# Patient Record
Sex: FEMALE | Race: WHITE | NOT HISPANIC OR LATINO | Employment: FULL TIME | URBAN - METROPOLITAN AREA
[De-identification: names, ages, dates, MRNs, and addresses within clinical notes are randomized per-mention and may not be internally consistent; named-entity substitution may affect disease eponyms.]

---

## 2017-02-03 ENCOUNTER — TRANSFERRED RECORDS (OUTPATIENT)
Dept: HEALTH INFORMATION MANAGEMENT | Facility: CLINIC | Age: 32
End: 2017-02-03

## 2017-07-01 ENCOUNTER — HEALTH MAINTENANCE LETTER (OUTPATIENT)
Age: 32
End: 2017-07-01

## 2018-04-19 NOTE — PROGRESS NOTES
SUBJECTIVE:   CC: Mary Alice Aragon is an 33 year old woman who presents for preventive health visit.     Patient of Dr. Adelaida Lee from [facility] - transferring care to us at [facility].  Records requested.     Has an eating disorder - working with her therapist on that. Her therapist Used to work at the [employer]. She's never discussed it with a doctor.  Started with food restriction and purging as a teenager.  Went into remission for years.   Son was stillborn - 36.5 EGA.  - his 2nd birthday would have been 2 weeks ago.   Seeing her therapist every 2 weeks. Looks to food currently for comfort and binges /sometimes purges , also having some food restriction issues now. Nothing found on autopsy or genetic testing with fetal  demise.     Getting some anxiety with chest pain. Worries that she has a bit of an ache that gets worse with anxiety. Has xanax on hand for severe anxiety.   Her buspirone helps a little, but increased dose seemed to make things worse.  Citalopram working well - we discussed that it is concerned     Wt Readings from Last 5 Encounters:   04/20/18 121 lb 3.2 oz (55 kg)   04/04/16 153 lb (69.4 kg)   11/11/14 139 lb 14.4 oz (63.5 kg)   04/30/14 126 lb (57.2 kg)   01/23/13 145 lb (65.8 kg)        She'd like to start trying for another pregnancy in fall of this year. She'd like to see another ob/gyn for care as her previous wasn't a good fit for her.      Chronic neck issues for year - massage therapy working well for her.  Tendency for muscle spasms.  No hx of MVA or other definite injury.  No hx of DDD or other OA changes in neck.  Has had negative imaging.  Very occasional  numbness, tingling on the left side - comes and goes, mild  But no weakness in upper or lower extremities. No bowel or bladder control problems.       Healthy Habits:    Do you get at least three servings of calcium containing foods daily (dairy, green leafy vegetables, etc.)? yes    Amount of  exercise or daily activities, outside of work: Walking    Problems taking medications regularly No    Medication side effects: No    Have you had an eye exam in the past two years? no    Do you see a dentist twice per year? yes    Do you have sleep apnea, excessive snoring or daytime drowsiness?no      Anxiety Follow-Up:     Status since last visit: Doing well    Other associated symptoms:None    Complicating factors:   Significant life event: Yes-  Son passed away in 2016, he was still born.  Current substance abuse: Marijuana  Depression symptoms: Yes-  A little  SHANICE-7 SCORE 2013   Total Score 1 18 -   Total Score - - 9   SHANICE-7      Today's PHQ-2 Score:   PHQ-2 (  [employer]) 2018   Q1: Little interest or pleasure in doing things 2 3   Q2: Feeling down, depressed or hopeless 1 3   PHQ-2 Score 3 6       Abuse: Current or Past(Physical, Sexual or Emotional)- No  Do you feel safe in your environment - Yes      Social History   Substance Use Topics     Smoking status: Former Smoker     Packs/day: 0.50     Years: 12.00     Types: Cigarettes     Quit date: 2011     Smokeless tobacco: Never Used      Comment: Quit 2011     Alcohol use Yes      Comment: 2 beers weekly     If you drink alcohol do you typically have >3 drinks per day or >7 drinks per week? No                     Reviewed orders with patient.  Reviewed health maintenance and updated orders accordingly - Yes  BP Readings from Last 3 Encounters:   18 126/74   16 119/70   14 102/58    Wt Readings from Last 3 Encounters:   18 121 lb 3.2 oz (55 kg)   16 153 lb (69.4 kg)   14 139 lb 14.4 oz (63.5 kg)                  Patient Active Problem List   Diagnosis     Recurrent UTI     Anxiety disorder     Moderate Depression [296.32]     Former smoker     Hyperlipidemia LDL goal <160     Missed      Indication for care in labor or delivery     Fetal demise       (spontaneous vaginal delivery)     Previous stillbirth or demise, antepartum - at 36.5 wks EGA  4/5/2016      Neck muscle spasm     Other mixed anxiety disorders     Controlled substance agreement signed- 4/20/2018 - ok for #30 xanax 1mg tabs every 3-4 months or so for severe anxiety - ok to see q6mos     Marijuana use, continuous- nightly for sleep - working with therapist on quitting      Adjustment disorder with mixed anxiety and depressed mood- since stillbirth of son 4/5/2016      Past Surgical History:   Procedure Laterality Date     CT SCAN ABDOMEN/PELVIS  12/2009    normal     DILATION AND CURETTAGE SUCTION N/A 11/11/2014    Procedure: DILATION AND CURETTAGE SUCTION;  Surgeon: Adelaida Lee MD;  Location: [facility] ESOPHAGOSCOPY, DIAGNOSTIC  12/2009    normal, negative bx for sprue and H. pylori     HC US ABDOMEN COMPLETE  12/2009    normal     NO HISTORY OF SURGERY         Social History   Substance Use Topics     Smoking status: Former Smoker     Packs/day: 0.50     Years: 12.00     Types: Cigarettes     Quit date: 7/31/2011     Smokeless tobacco: Never Used      Comment: Quit 07/2011     Alcohol use Yes      Comment: 2 beers weekly     Family History   Problem Relation Age of Onset     Hypertension Mother      Lipids Mother      Thyroid Disease Mother      hypothyroidism     Lipids Father      Psychotic Disorder Paternal Grandmother      depression/anxiety     Hypertension Paternal Grandfather      Osteoarthritis Maternal Grandmother      DIABETES Maternal Grandmother      type 2     CANCER Maternal Grandmother      cancer in the neck, nonsmoker     Hypertension Maternal Grandmother      CANCER Maternal Grandfather      lung, smoker     CANCER Paternal Aunt      brain, early 40's         Current Outpatient Prescriptions   Medication Sig Dispense Refill     ALPRAZolam (XANAX) 1 MG tablet Take 1 tablet (1 mg) by mouth 3 times daily as needed for anxiety 30 tablet 1     busPIRone (BUSPAR) 10  MG tablet Take 2 tablets (20 mg) by mouth daily 90 tablet 0     citalopram (CELEXA) 40 MG tablet Take 1 tablet (40 mg) by mouth daily 90 tablet 3     Krill Oil 1000 MG CAPS Take 1,000 mg by mouth 2 times daily       [DISCONTINUED] citalopram (CELEXA) 40 MG tablet Take 1 tablet (40 mg) by mouth daily 90 tablet 3     Allergies   Allergen Reactions     Ciprofloxacin Rash     Recent Labs   Lab Test  01/16/13   0804 08/17/11 08/05/10   LDL  106  149.0  117@   HDL  62  62  68@   TRIG  132  131  88@   ALT   --   12  12@   CR   --   0.6  0.7@   POTASSIUM   --   4.8  4.8@   TSH  1.32  1.41   --       Mammogram not appropriate for this patient based on age.    Us Breast Bilateral Limited 1-3 Quadrants    Result Date: 8/16/2016  Narrative: MAMMOGRAPHY DIAGNOSTIC BILATERAL W/ TOMOSYNTHESIS, US BREAST BILATERAL LIMITED ONE-THREE QUADRANTS- 8/16/2016 9:28 AM  HISTORY: 31-year-old woman with a palpable lump in the left breast. COMPARISON: None, baseline BREAST DENSITY: Heterogeneously dense. FINDINGS: A bilateral mammogram with tomosynthesis was performed. A marker was placed at the site of the palpable lump in the left breast and there is a partially obscured mass underlying the marker. There is also a partially obscured mass in the lateral right breast. Bilateral breast ultrasound was performed. Ultrasound targeted to the palpable lump in the left breast demonstrates a simple cyst measuring 1.7 cm in diameter, at the 3 o'clock position 3 cm from the nipple. Ultrasound of the lateral right breast was performed because of the mammographic finding. At the 8 o'clock position 1 cm from the nipple, there is a simple cyst measuring 0.9 cm. No solid mass or worrisome sonographic finding is seen in either breast.       History of abnormal Pap smear: NO - age 30- 65 PAP every 3 years recommended    Lab Results   Component Value Date    PAP NIL 01/23/2013    PAP NIL 01/18/2012    PAP NIL 11/09/2010       Reviewed and updated as needed this  visit by clinical staff  Tobacco  Allergies  Meds  Med Hx  Surg Hx  Fam Hx  Soc Hx        Reviewed and updated as needed this visit by Provider        Past Medical History:   Diagnosis Date     Anxiety disorder     with strong component of GI symptoms     Bronchospasm     bronchospasm triggered by exercise, smoking     Former smoker     quit  with a 6-pack-year history     Major depressive disorder, recurrent episode, moderate (H) 2000/10    was treated with Effexor, saw Dr. Polanco, h/o suicide attempt asa/tylenol , tylenol ,self mutilation.  Bipolar diagnosis entertained     Mixed hyperlipidemia      Obesity     resolved - previously had increased bronchospasm when she was 100lbs heavier      Other mixed anxiety disorders 2018     Recurrent UTI     saw urol tx'd with flomax      Past Surgical History:   Procedure Laterality Date     CT SCAN ABDOMEN/PELVIS  2009    normal     DILATION AND CURETTAGE SUCTION N/A 2014    Procedure: DILATION AND CURETTAGE SUCTION;  Surgeon: Adelaida Lee MD;  Location: [facility] ESOPHAGOSCOPY, DIAGNOSTIC  2009    normal, negative bx for sprue and H. pylori     HC US ABDOMEN COMPLETE  2009    normal     NO HISTORY OF SURGERY       Obstetric History       T0      L0     SAB1   TAB0   Ectopic0   Multiple0   Live Births0       # Outcome Date GA Lbr Alvin/2nd Weight Sex Delivery Anes PTL Lv   2  / 36w4d 07:30 / 02:58 5 lb 2.7 oz (2.344 kg) M Vag-Spont EPI N FD      Apgar1:  0                Apgar5: 0   1 SAB                   ROS:   CONSTITUTIONAL: NEGATIVE for fever, chills, change in weight  INTEGUMENTARU/SKIN: NEGATIVE for worrisome rashes, moles or lesions  EYES: NEGATIVE for vision changes or irritation  ENT: NEGATIVE for ear, mouth and throat problems  RESP: NEGATIVE for significant cough or SOB  BREAST: NEGATIVE for masses, tenderness or discharge  CV: NEGATIVE for chest pain, palpitations or  "peripheral edema  GI: NEGATIVE for nausea, abdominal pain, heartburn, or change in bowel habits  : NEGATIVE for unusual urinary or vaginal symptoms. Periods are regular.  MUSCULOSKELETAL: NEGATIVE for significant arthralgias or myalgia  NEURO: NEGATIVE for weakness, dizziness or paresthesias  PSYCHIATRIC: NEGATIVE for changes in mood or affect    OBJECTIVE:   /74 (BP Location: Left arm, Patient Position: Chair, Cuff Size: Adult Regular)  Pulse 79  Temp 98.1  F (36.7  C) (Oral)  Ht 5' 6\" (1.676 m)  Wt 121 lb 3.2 oz (55 kg)  LMP 04/01/2018 (Exact Date)  SpO2 98%  BMI 19.56 kg/m2  EXAM:  GENERAL: healthy, alert and no distress  EYES: Eyes grossly normal to inspection, PERRL and conjunctivae and sclerae normal  HENT: ear canals and TM's normal, nose and mouth without ulcers or lesions  NECK: no adenopathy, no asymmetry, masses, or scars and thyroid normal to palpation  RESP: lungs clear to auscultation - no rales, rhonchi or wheezes  BREAST: normal without masses, tenderness or nipple discharge and no palpable axillary masses or adenopathy  CV: regular rate and rhythm, normal S1 S2, no S3 or S4, no murmur, click or rub, no peripheral edema and peripheral pulses strong  ABDOMEN: soft, nontender, no hepatosplenomegaly, no masses and bowel sounds normal   (female): normal female external genitalia, normal urethral meatus, vaginal mucosa pink, moist, well rugated, and normal cervix/adnexa/uterus without masses or discharge  MS: no gross musculoskeletal defects noted, no edema  SKIN: no suspicious lesions or rashes, multiple pigmented nevi are noted from scalp to feet.   NEURO: Normal strength and tone, mentation intact and speech normal  PSYCH: mentation appears normal, affect normal/bright.     ASSESSMENT/PLAN:       ICD-10-CM    1. Encounter for routine adult medical exam with abnormal findings Z00.01 Comprehensive metabolic panel     CBC with platelets     TSH with free T4 reflex     Lipid panel reflex " "to direct LDL Fasting   2. Moderate Depression [296.32] F33.1 DEPRESSION ACTION PLAN (DAP)     busPIRone (BUSPAR) 10 MG tablet   3. Previous stillbirth or demise, antepartum - at 36.5 wks EGA  4/2016  O09.299 OB/GYN REFERRAL   4. Neck muscle spasm M62.838    5. Other mixed anxiety disorders F41.3 busPIRone (BUSPAR) 10 MG tablet     ALPRAZolam (XANAX) 1 MG tablet   6. Other specified anxiety disorders F41.8 busPIRone (BUSPAR) 10 MG tablet     citalopram (CELEXA) 40 MG tablet     ALPRAZolam (XANAX) 1 MG tablet   7. Controlled substance agreement signed- 4/20/2018 - ok for #30 xanax 1mg tabs every 3-4 months or so for severe anxiety - ok to see q6mos Z79.899 ALPRAZolam (XANAX) 1 MG tablet   8. Marijuana use, continuous- nightly for sleep - working with therapist on quitting  F12.90    9. Adjustment disorder with mixed anxiety and depressed mood- since stillbirth of son 4/5/2016  F43.23 busPIRone (BUSPAR) 10 MG tablet     ALPRAZolam (XANAX) 1 MG tablet   10. Multiple pigmented nevi D22.9 DERMATOLOGY REFERRAL   11. Screening for malignant neoplasm of cervix Z12.4 Pap imaged thin layer screen with HPV - recommended age 30 - 65 years (select HPV order below)     HPV High Risk Types DNA Cervical       COUNSELING:   Reviewed preventive health counseling, as reflected in patient instructions    Recheck for fasting lab only visit in the next month or so.      reports that she quit smoking about 6 years ago. Her smoking use included Cigarettes. She has a 6.00 pack-year smoking history. She has never used smokeless tobacco.    Estimated body mass index is 19.56 kg/(m^2) as calculated from the following:    Height as of this encounter: 5' 6\" (1.676 m).    Weight as of this encounter: 121 lb 3.2 oz (55 kg).        controlled substance agreement discussed in detail today with patient - line item by line item initialed by patient-  and signed today.     Spent  32 minutes on pt care today outside of preventative care. All face to " face time from 2:30pm  to 3:02pm .  Greater than 50% of time spent in coordination of care/counseling today re:  Moderate Depression [296.32]    Previous stillbirth or demise, antepartum - at 36.5 wks EGA  4/2016     Neck muscle spasm    Other mixed anxiety disorders    Other specified anxiety disorders    Controlled substance agreement signed- 4/20/2018 - ok for #30 xanax 1mg tabs every 3-4 months or so for severe anxiety - ok to see q6mos    Marijuana use, continuous- nightly for sleep - working with therapist on quitting     Adjustment disorder with mixed anxiety and depressed mood- since stillbirth of son 4/5/2016     Multiple pigmented nevi       Pt planning to quit smoking marijuana prior to conception - encouraged pt to set a quit date for this to continue controlled substance agreement.      Counseling Resources:  ATP IV Guidelines  Pooled Cohorts Equation Calculator  Breast Cancer Risk Calculator  FRAX Risk Assessment  ICSI Preventive Guidelines  Dietary Guidelines for Americans, 2010  USDA's MyPlate  ASA Prophylaxis  Lung CA Screening    Corin Villagran MD  [facility]

## 2018-04-19 NOTE — PATIENT INSTRUCTIONS
Strongly recommend Yoga for your neck and back .   Yoga for You is a good class option for beginners.     Yoga for beginners on YouTube , [employer] is another option - always look for beginners.     Start a prenatal vitamin with folic acid now - to take daily - in case conception occurs earlier than expected.     Traditional Chinese medicine practitioners:   Wanda Mendez L.Ac ([place]), [facility]   Licensed Accupuncturist  Master of [place] [employer]     [facility]  Phone : [phone]   [facility] [place] [address]    [email or web address]    [facility] in [place]  ask for LYRIC Deng  [facility]   Phone: [phone]   [facility]   [address]   [place] [address]   (Enter through door E or F, clinic is directly between both entrances)  Affiliated with Dr. Christian below    Dr. Mikel Christian, DENISE  [facility]  [address]   [address]  [place] [address]  Phone:  [phone]  Fax: [phone]   [email or web address]    Recheck for fasting lab only visit in the next month or so.     Recheck with me again in 6 months or sooner , if needed.       Preventive Health Recommendations  Female Ages 26 - 39  Yearly exam:   See your health care provider every year in order to    Review health changes.     Discuss preventive care.      Review your medicines if you your doctor has prescribed any.    Until age 30: Get a Pap test every three years (more often if you have had an abnormal result).    After age 30: Talk to your doctor about whether you should have a Pap test every 3 years or have a Pap test with HPV screening every 5 years.   You do not need a Pap test if your uterus was removed (hysterectomy) and you have not had cancer.  You should be tested each year for STDs (sexually transmitted diseases), if you're at risk.   Talk to your provider about how often to  have your cholesterol checked.  If you are at risk for diabetes, you should have a diabetes test (fasting glucose).  Shots: Get a flu shot each year. Get a tetanus shot every 10 years.   Nutrition:     Eat at least 5 servings of fruits and vegetables each day.    Eat whole-grain bread, whole-wheat pasta and brown rice instead of white grains and rice.    Talk to your provider about Calcium and Vitamin D.     Lifestyle    Exercise at least 150 minutes a week (30 minutes a day, 5 days of the week). This will help you control your weight and prevent disease.    Limit alcohol to one drink per day.    No smoking.     Wear sunscreen to prevent skin cancer.    See your dentist every six months for an exam and cleaning.               Thank you for choosing [facility]  for your Health Care. It was a pleasure seeing you at your visit today. Please contact us with any questions or concerns you may have.                   Corin Villagran MD                                  To reach your [facility] care team after hours call:   [phone]    Our clinic hours are:     Monday- 7:30 am - 7:00 pm                             Tuesday through Friday- 7:30 am - 5:00 pm                                        Saturday- 8:00 am - 12:00 pm                  Phone:  [phone]    Our pharmacy hours are:     Monday  8:00 am to 7:00 pm      Tuesday through Friday 8:00am to 6:00pm                        Saturday - 9:00 am to 1:00 pm      Sunday : Closed.              Phone:  [phone]      There is also information available at our web site:  [email or web address]    If your provider ordered any lab tests and you do not receive the results within 10 business days, please call the clinic.    If you need a medication refill please contact your pharmacy.  Please allow 2 business days for your refill to be completed.    Our clinic offers telephone visits and e visits.  Please ask one of your team members  to explain more.      Use [email or web address] (secure email communication and access to your chart) to send your primary care provider a message or make an appointment. Ask someone on your Team how to sign up for [email or web address].        Please do set a quit date for your marijuana use prior to conception - as soon as possible.

## 2018-04-20 ENCOUNTER — OFFICE VISIT (OUTPATIENT)
Dept: FAMILY MEDICINE | Facility: CLINIC | Age: 33
End: 2018-04-20
Payer: COMMERCIAL

## 2018-04-20 VITALS
SYSTOLIC BLOOD PRESSURE: 126 MMHG | OXYGEN SATURATION: 98 % | HEIGHT: 66 IN | BODY MASS INDEX: 19.48 KG/M2 | HEART RATE: 79 BPM | WEIGHT: 121.2 LBS | DIASTOLIC BLOOD PRESSURE: 74 MMHG | TEMPERATURE: 98.1 F

## 2018-04-20 DIAGNOSIS — F41.8 OTHER SPECIFIED ANXIETY DISORDERS: ICD-10-CM

## 2018-04-20 DIAGNOSIS — F33.1 MAJOR DEPRESSIVE DISORDER, RECURRENT EPISODE, MODERATE (H): ICD-10-CM

## 2018-04-20 DIAGNOSIS — F12.90 MARIJUANA USE, CONTINUOUS: ICD-10-CM

## 2018-04-20 DIAGNOSIS — F41.3 OTHER MIXED ANXIETY DISORDERS: ICD-10-CM

## 2018-04-20 DIAGNOSIS — O09.299 PREVIOUS STILLBIRTH OR DEMISE, ANTEPARTUM: ICD-10-CM

## 2018-04-20 DIAGNOSIS — D22.9 MULTIPLE PIGMENTED NEVI: ICD-10-CM

## 2018-04-20 DIAGNOSIS — Z00.01 ENCOUNTER FOR ROUTINE ADULT MEDICAL EXAM WITH ABNORMAL FINDINGS: Primary | ICD-10-CM

## 2018-04-20 DIAGNOSIS — Z12.4 SCREENING FOR MALIGNANT NEOPLASM OF CERVIX: ICD-10-CM

## 2018-04-20 DIAGNOSIS — M62.838 NECK MUSCLE SPASM: ICD-10-CM

## 2018-04-20 DIAGNOSIS — F43.23 ADJUSTMENT DISORDER WITH MIXED ANXIETY AND DEPRESSED MOOD: ICD-10-CM

## 2018-04-20 DIAGNOSIS — Z79.899 CONTROLLED SUBSTANCE AGREEMENT SIGNED: ICD-10-CM

## 2018-04-20 PROCEDURE — 87624 HPV HI-RISK TYP POOLED RSLT: CPT | Performed by: FAMILY MEDICINE

## 2018-04-20 PROCEDURE — G0145 SCR C/V CYTO,THINLAYER,RESCR: HCPCS | Performed by: FAMILY MEDICINE

## 2018-04-20 PROCEDURE — 99385 PREV VISIT NEW AGE 18-39: CPT | Performed by: FAMILY MEDICINE

## 2018-04-20 PROCEDURE — 99214 OFFICE O/P EST MOD 30 MIN: CPT | Mod: 25 | Performed by: FAMILY MEDICINE

## 2018-04-20 RX ORDER — BUSPIRONE HYDROCHLORIDE 10 MG/1
20 TABLET ORAL DAILY
COMMUNITY
End: 2018-04-20

## 2018-04-20 RX ORDER — ALPRAZOLAM 1 MG
1 TABLET ORAL 3 TIMES DAILY PRN
Qty: 30 TABLET | Refills: 1 | Status: SHIPPED | OUTPATIENT
Start: 2018-04-20

## 2018-04-20 RX ORDER — BUSPIRONE HYDROCHLORIDE 10 MG/1
20 TABLET ORAL DAILY
Qty: 90 TABLET | Refills: 0 | Status: SHIPPED | OUTPATIENT
Start: 2018-04-20

## 2018-04-20 RX ORDER — ALPRAZOLAM 1 MG
1 TABLET ORAL 3 TIMES DAILY PRN
COMMUNITY
End: 2018-04-20

## 2018-04-20 RX ORDER — CITALOPRAM HYDROBROMIDE 40 MG/1
40 TABLET ORAL DAILY
Qty: 90 TABLET | Refills: 3 | Status: SHIPPED | OUTPATIENT
Start: 2018-04-20 | End: 2019-05-06

## 2018-04-20 ASSESSMENT — PATIENT HEALTH QUESTIONNAIRE - PHQ9: 5. POOR APPETITE OR OVEREATING: MORE THAN HALF THE DAYS

## 2018-04-20 ASSESSMENT — ANXIETY QUESTIONNAIRES
2. NOT BEING ABLE TO STOP OR CONTROL WORRYING: MORE THAN HALF THE DAYS
IF YOU CHECKED OFF ANY PROBLEMS ON THIS QUESTIONNAIRE, HOW DIFFICULT HAVE THESE PROBLEMS MADE IT FOR YOU TO DO YOUR WORK, TAKE CARE OF THINGS AT HOME, OR GET ALONG WITH OTHER PEOPLE: SOMEWHAT DIFFICULT
7. FEELING AFRAID AS IF SOMETHING AWFUL MIGHT HAPPEN: SEVERAL DAYS
3. WORRYING TOO MUCH ABOUT DIFFERENT THINGS: MORE THAN HALF THE DAYS
5. BEING SO RESTLESS THAT IT IS HARD TO SIT STILL: NOT AT ALL
1. FEELING NERVOUS, ANXIOUS, OR ON EDGE: SEVERAL DAYS
GAD7 TOTAL SCORE: 9
6. BECOMING EASILY ANNOYED OR IRRITABLE: SEVERAL DAYS

## 2018-04-20 NOTE — LETTER
My Depression Action Plan  Name: Mary Alice Aragon   Date of Birth 1985  Date: 4/19/2018    My doctor: Adelaida Lee   My clinic: [address] [id number] [phone]          GREEN    ZONE   Good Control    What it looks like:     Things are going generally well. You have normal up s and down s. You may even feel depressed from time to time, but bad moods usually last less than a day.   What you need to do:  1. Continue to care for yourself (see self care plan)  2. Check your depression survival kit and update it as needed  3. Follow your physician s recommendations including any medication.  4. Do not stop taking medication unless you consult with your physician first.           YELLOW         ZONE Getting Worse    What it looks like:     Depression is starting to interfere with your life.     It may be hard to get out of bed; you may be starting to isolate yourself from others.    Symptoms of depression are starting to last most all day and this has happened for several days.     You may have suicidal thoughts but they are not constant.   What you need to do:     1. Call your care team, your response to treatment will improve if you keep your care team informed of your progress. Yellow periods are signs an adjustment may need to be made.     2. Continue your self-care, even if you have to fake it!    3. Talk to someone in your support network    4. Open up your depression survival kit           RED    ZONE Medical Alert - Get Help    What it looks like:     Depression is seriously interfering with your life.     You may experience these or other symptoms: You can t get out of bed most days, can t work or engage in other necessary activities, you have trouble taking care of basic hygiene, or basic responsibilities, thoughts of suicide or death that will not go away, self-injurious behavior.     What you need to do:  1. Call  your care team and request a same-day appointment. If they are not available (weekends or after hours) call your local crisis line, emergency room or 911.            Depression Self Care Plan / Survival Kit    Self-Care for Depression  Here s the deal. Your body and mind are really not as separate as most people think.  What you do and think affects how you feel and how you feel influences what you do and think. This means if you do things that people who feel good do, it will help you feel better.  Sometimes this is all it takes.  There is also a place for medication and therapy depending on how severe your depression is, so be sure to consult with your medical provider and/ or Behavioral Health Consultant if your symptoms are worsening or not improving.     In order to better manage my stress, I will:    Exercise  Get some form of exercise, every day. This will help reduce pain and release endorphins, the  feel good  chemicals in your brain. This is almost as good as taking antidepressants!  This is not the same as joining a gym and then never going! (they count on that by the way ) It can be as simple as just going for a walk or doing some gardening, anything that will get you moving.      Hygiene   Maintain good hygiene (Get out of bed in the morning, Make your bed, Brush your teeth, Take a shower, and Get dressed like you were going to work, even if you are unemployed).  If your clothes don't fit try to get ones that do.    Diet  I will strive to eat foods that are good for me, drink plenty of water, and avoid excessive sugar, caffeine, alcohol, and other mood-altering substances.  Some foods that are helpful in depression are: complex carbohydrates, B vitamins, flaxseed, fish or fish oil, fresh fruits and vegetables.    Psychotherapy  I agree to participate in Individual Therapy (if recommended).    Medication  If prescribed medications, I agree to take them.  Missing doses can result in serious side effects.   I understand that drinking alcohol, or other illicit drug use, may cause potential side effects.  I will not stop my medication abruptly without first discussing it with my provider.    Staying Connected With Others  I will stay in touch with my friends, family members, and my primary care provider/team.    Use your imagination  Be creative.  We all have a creative side; it doesn t matter if it s oil painting, sand castles, or mud pies! This will also kick up the endorphins.    Witness Beauty  (AKA stop and smell the roses) Take a look outside, even in mid-winter. Notice colors, textures. Watch the squirrels and birds.     Service to others  Be of service to others.  There is always someone else in need.  By helping others we can  get out of ourselves  and remember the really important things.  This also provides opportunities for practicing all the other parts of the program.    Humor  Laugh and be silly!  Adjust your TV habits for less news and crime-drama and more comedy.    Control your stress  Try breathing deep, massage therapy, biofeedback, and meditation. Find time to relax each day.     My support system    Clinic Contact:  Phone number:    Contact 1:  Phone number:    Contact 2:  Phone number:    [employer]/:  Phone number:    Therapist:  Phone number:    [facility]:    Phone number:    Other community support:  Phone number:

## 2018-04-20 NOTE — LETTER
[address] [address]  Phone: [phone]  Fax: [phone]  April 20, 2018     AUTHORIZATION TO RELEASE PROTECTED HEALTH INFORMATION    Patient Name:  Mary Alice Aragon  YOB: 1985    [place] MRN:[id number]             This will authorize [facility]  to request information from :     [facility] Phone: [phone]  Fax: [phone]    The following information is to be released for health maintenance and continuing care purposes with my primary care clinic:                 Pap Smear Report(most recent only)       When: 2014    Visit Notes     Lab Results    -I understand that I may revoke this authorization by written request at any time to the address listed at the top of this form.  I understand that the revocation will not apply to information that has already been released in response to this authorization.    -This authorization last for one year after the date you sign it.     -[facility] cannot prevent redisclosure of the information by the person or organization who receives your records under this authorization, and that information may not be covered by state and federal privacy protections after it is released. By signing this authorization, you release [facility] from any and all liability resulting from a redisclosure by the recipient.    ___________________________________          _____________  Signature of Patient/Authorized Person                     Date        ____________________________________________  (Reason if patient is unable to sign)

## 2018-04-20 NOTE — MR AVS SNAPSHOT
After Visit Summary   4/20/2018    Mary Alice Aragon    MRN: [id number]           Patient Information     Date Of Birth          1985        Visit Information        Provider Department      4/20/2018 2:00 PM Corin Villagran MD [facility]        Today's Diagnoses     Encounter for routine adult medical exam with abnormal findings    -  1    Moderate Depression [296.32]        Screening for malignant neoplasm of cervix        Previous stillbirth or demise, antepartum - at 36.5 wks EGA  4/2016         Neck muscle spasm        Other mixed anxiety disorders        Other specified anxiety disorders        Controlled substance agreement signed- 4/20/2018 - ok for #30 xanax 1mg tabs every 3-4 months or so for severe anxiety - ok to see q6mos        Marijuana use, continuous- nightly for sleep - working with therapist on quitting         Adjustment disorder with mixed anxiety and depressed mood- since stillbirth of son 4/5/2016         Multiple pigmented nevi          Care Instructions    Strongly recommend Yoga for your neck and back .   Yoga for You is a good class option for beginners.     Yoga for beginners on YouTube , [employer] is another option - always look for beginners.     Start a prenatal vitamin with folic acid now - to take daily - in case conception occurs earlier than expected.     Traditional Chinese medicine practitioners:   Wanda Mendez L.Ac (Joyce), MS   Licensed Accupuncturist  Master of [facility]     [facility]  Phone : [phone]   [facility] [place] [address]    [email or web address]    [facility] in [place]  ask for LYRIC Deng  [facility]   Phone: [phone]   [address]   [place] [address]   (Enter through door E or F, clinic is directly between both entrances)  Affiliated with Dr. Christian  below    Dr. Mikel Christian, TCMD  [facility]  [address]   [address]  [place] [address]  Phone:  [phone]  Fax: [phone]   [email or web address]    Recheck for fasting lab only visit in the next month or so.     Recheck with me again in 6 months or sooner , if needed.       Preventive Health Recommendations  Female Ages 26 - 39  Yearly exam:   See your health care provider every year in order to    Review health changes.     Discuss preventive care.      Review your medicines if you your doctor has prescribed any.    Until age 30: Get a Pap test every three years (more often if you have had an abnormal result).    After age 30: Talk to your doctor about whether you should have a Pap test every 3 years or have a Pap test with HPV screening every 5 years.   You do not need a Pap test if your uterus was removed (hysterectomy) and you have not had cancer.  You should be tested each year for STDs (sexually transmitted diseases), if you're at risk.   Talk to your provider about how often to have your cholesterol checked.  If you are at risk for diabetes, you should have a diabetes test (fasting glucose).  Shots: Get a flu shot each year. Get a tetanus shot every 10 years.   Nutrition:     Eat at least 5 servings of fruits and vegetables each day.    Eat whole-grain bread, whole-wheat pasta and brown rice instead of white grains and rice.    Talk to your provider about Calcium and Vitamin D.     Lifestyle    Exercise at least 150 minutes a week (30 minutes a day, 5 days of the week). This will help you control your weight and prevent disease.    Limit alcohol to one drink per day.    No smoking.     Wear sunscreen to prevent skin cancer.    See your dentist every six months for an exam and cleaning.               Thank you for choosing [facility]  for your Health Care. It was a pleasure seeing you at your visit today. Please contact us with any questions or  concerns you may have.                   Corin Villagran MD                                  To reach your [facility] care team after hours call:   [phone]    Our clinic hours are:     Monday- 7:30 am - 7:00 pm                             Tuesday through Friday- 7:30 am - 5:00 pm                                        Saturday- 8:00 am - 12:00 pm                  Phone:  [phone]    Our pharmacy hours are:     Monday  8:00 am to 7:00 pm      Tuesday through Friday 8:00am to 6:00pm                        Saturday - 9:00 am to 1:00 pm      Sunday : Closed.              Phone:  [phone]      There is also information available at our web site:  [email or web address]    If your provider ordered any lab tests and you do not receive the results within 10 business days, please call the clinic.    If you need a medication refill please contact your pharmacy.  Please allow 2 business days for your refill to be completed.    Our clinic offers telephone visits and e visits.  Please ask one of your team members to explain more.      Use [employer] (secure email communication and access to your chart) to send your primary care provider a message or make an appointment. Ask someone on your Team how to sign up for [employer].        Please do set a quit date for your marijuana use prior to conception - as soon as possible.                        Follow-ups after your visit        Additional Services     DERMATOLOGY REFERRAL       Your provider has referred you to: [facility]: [facility] [place] [phone]   [email or web address] and full body skin exam     Please be aware that coverage of these services is subject to the terms and limitations of your health insurance plan.  Call member services at your health plan with any benefit or coverage questions.      Please bring the following with you to your appointment:    (1) Any X-Rays, CTs or MRIs which  have been performed.  Contact the facility where they were done to arrange for  prior to your scheduled appointment.  Any new CT, MRI or other procedures ordered by your specialist must be performed at a [facility] facility or coordinated by your clinic's referral office.  (2) List of current medications  (3) This referral request   (4) Any documents/labs given to you for this referral            OB/GYN REFERRAL       Your provider has referred you to:  [facility]: OBGYN BRAYAN Nguyen [phone]   [email or web address] --- Dr. Brittany Humphrey or Dr. Guerline Tafoya or Dr. Margret Tafoya     Please be aware that coverage of these services is subject to the terms and limitations of your health insurance plan.  Call member services at your health plan with any benefit or coverage questions.      Please bring the following with you to your appointment:    (1) Any X-Rays, CTs or MRIs which have been performed.  Contact the facility where they were done to arrange for  prior to your scheduled appointment.  Any new CT, MRI or other procedures ordered by your specialist must be performed at a [facility] facility or coordinated by your clinic's referral office.    (2) List of current medications   (3) This referral request   (4) Any documents/labs given to you for this referral                  Follow-up notes from your care team     Return in about 1 month (around 5/20/2018) for Lab Work as a lab only visit and with Dr. MARTÍNEZ in 6 months for mood .      Future tests that were ordered for you today     Open Future Orders        Priority Expected Expires Ordered    Comprehensive metabolic panel Routine 5/5/2018 8/20/2018 4/20/2018    CBC with platelets Routine 5/5/2018 8/20/2018 4/20/2018    TSH with free T4 reflex Routine 5/5/2018 8/20/2018 4/20/2018    Lipid panel reflex to direct LDL Fasting Routine 5/5/2018 8/20/2018 4/20/2018            Who to contact     If you have questions or need follow up information about  "today's clinic visit or your schedule please contact [facility] directly at [phone].  Normal or non-critical lab and imaging results will be communicated to you by MyChart, letter or phone within 4 business days after the clinic has received the results. If you do not hear from us within 7 days, please contact the clinic through [employer] or phone. If you have a critical or abnormal lab result, we will notify you by phone as soon as possible.  Submit refill requests through [employer] or call your pharmacy and they will forward the refill request to us. Please allow 3 business days for your refill to be completed.          Additional Information About Your Visit        [employer] Information     [employer] gives you secure access to your electronic health record. If you see a primary care provider, you can also send messages to your care team and make appointments. If you have questions, please call your primary care clinic.  If you do not have a primary care provider, please call [phone] and they will assist you.        Care EveryWhere ID     This is your Care EveryWhere ID. This could be used by other organizations to access your [facility] medical records  [id number]        Your Vitals Were     Pulse Temperature Height Last Period Pulse Oximetry BMI (Body Mass Index)    79 98.1  F (36.7  C) (Oral) 5' 6\" (1.676 m) 04/01/2018 (Exact Date) 98% 19.56 kg/m2       Blood Pressure from Last 3 Encounters:   04/20/18 126/74   04/05/16 119/70   11/11/14 102/58    Weight from Last 3 Encounters:   04/20/18 121 lb 3.2 oz (55 kg)   04/04/16 153 lb (69.4 kg)   11/11/14 139 lb 14.4 oz (63.5 kg)              We Performed the Following     DEPRESSION ACTION PLAN (DAP)     DERMATOLOGY REFERRAL     HPV High Risk Types DNA Cervical     OB/GYN REFERRAL     Pap imaged thin layer screen with HPV - recommended age 30 - 65 years (select HPV order below)          Today's Medication Changes          These changes are accurate " as of 4/20/18  3:18 PM.  If you have any questions, ask your nurse or doctor.               Stop taking these medicines if you haven't already. Please contact your care team if you have questions.     prenatal multivitamin plus iron 27-0.8 MG Tabs per tablet   Stopped by:  Corin Villagran MD           VITAMIN D (CHOLECALCIFEROL) PO   Stopped by:  Corin Villagran MD                Where to get your medicines      These medications were sent to [facility] [id number] - [place], [place] - [address] [facility] [address] [address]  [address] [facility] [address] [facility] [address], [place] [address]     Phone:  [phone]     busPIRone 10 MG tablet    citalopram 40 MG tablet         Some of these will need a paper prescription and others can be bought over the counter.  Ask your nurse if you have questions.     Bring a paper prescription for each of these medications     ALPRAZolam 1 MG tablet                Primary Care Provider Office Phone # Fax #    [facility] [phone]       [address] [place] [address]        Equal Access to Services     [facility]: Hadii iman fonseca hadasho Soomaali, waaxda luqadaha, qaybta kaalmada adeegyada, bennett adkins . So [facility] [phone].    ATENCIÓN: Si habla español, tiene a espinoza disposición servicios gratuitos de asistencia lingüística. Llame al [phone].    We comply with applicable federal civil rights laws and [place] laws. We do not discriminate on the basis of race, color, national origin, age, disability, sex, sexual orientation, or gender identity.            Thank you!     Thank you for choosing [facility]  for your care. Our goal is always to provide you with excellent care. Hearing back from our patients is one way we can continue to improve our services. Please take a few minutes to complete the written survey that you may receive in the mail after your visit with us. Thank you!             Your Updated Medication List -  Protect others around you: Learn how to safely use, store and throw away your medicines at www.disposemymeds.org.          This list is accurate as of 4/20/18  3:18 PM.  Always use your most recent med list.                   Brand Name Dispense Instructions for use Diagnosis    ALPRAZolam 1 MG tablet    XANAX    30 tablet    Take 1 tablet (1 mg) by mouth 3 times daily as needed for anxiety    Other mixed anxiety disorders, Other specified anxiety disorders, Controlled substance agreement signed, Adjustment disorder with mixed anxiety and depressed mood       busPIRone 10 MG tablet    BUSPAR    90 tablet    Take 2 tablets (20 mg) by mouth daily    Major depressive disorder, recurrent episode, moderate (H), Other mixed anxiety disorders, Other specified anxiety disorders, Adjustment disorder with mixed anxiety and depressed mood       citalopram 40 MG tablet    celeXA    90 tablet    Take 1 tablet (40 mg) by mouth daily    Other specified anxiety disorders       Krill Oil 1000 MG Caps      Take 1,000 mg by mouth 2 times daily

## 2018-04-20 NOTE — LETTER
[facility] [place]    04/20/18    Patient: Mary Alice Aragon  YOB: 1985  Medical Record Number: [id number]                                                                  Controlled Substance Agreement  I understand that my care provider has prescribed controlled substances (narcotics, tranquilizers, and/or stimulants) to help manage my condition(s).  I am taking this medicine to help me function or work.  I know that this is strong medicine.  It could have serious side effects and even cause a dependency on the drug.  If I stop these medicines suddenly, I could have severe withdrawal symptoms.    The risks, benefits, and side effects of these medication(s) were explained to me.  I agree that:  1. I will take part in other treatments as advised by my provider.  This may be psychiatry or counseling, physical therapy, behavioral therapy, group treatment, or a referral to a pain clinic.  I will reduce or stop my medicine when my provider tells me to do so.   2. I will take my medicines as prescribed.  I will not change the dose or schedule unless my provider tells me to.  There will be no refills if I  run out early.   I may be contacted at any time without warning and asked to complete a drug test or pill count.   3. I will keep all my appointments at the clinic.  If I miss appointments or fail to follow instructions, my provider may stop my medicine.  4. I will not ask other providers to prescribe controlled substances. And I will not accept controlled substances from other people. If I need another prescribed controlled substance for a new reason, I will notify my provider within one business day.  5. If I enroll in the [place] Medical Marijuana program, I will tell my provider.  I will also sign an agreement to share my medical records with my provider.  6. I will use one pharmacy to fill all of my controlled substance prescriptions.  If my prescription is mailed to my pharmacy, it may  take 5 to 7 days for my medicine to be ready.  7. I understand that my provider, clinic care team, and pharmacy can track controlled substance prescriptions from other providers through a central database (prescription monitoring program).  8. I will bring in my list of medications (or my medicine bottles) each time I come to the clinic.  REV- 04/2016                                                                                                                                            Page 1 of 2      [facility] [place]    04/20/18    Patient: Mary Alice Aragon  YOB: 1985  Medical Record Number: [id number]    9. Refills of controlled substances will be made only during office hours.  It is up to me to make sure that I do not run out of my medicines on weekends or holidays.    10. I am responsible for my prescriptions.  If the medicine is lost or stolen, it will not be replaced.   I also agree not to share these medicines with anyone.  11. I agree to not use ANY illegal or recreational drugs.  This includes marijuana, cocaine, bath salts or other drugs.  I agree not to use alcohol unless my provider says I may.  I agree to give urine samples whenever asked.  If I fail to give a urine sample, the provider may stop my medicine.     12. I will tell my nurse or provider right away if I become pregnant or have a new medical problem treated outside of [facility].  13. I understand that this medicine can affect my thinking and judgment.  It may be unsafe for me to drive, use machinery and do dangerous tasks.  I will not do any of these things until I know how the medicine affects me.  If my dose changes, I will wait to see how it affects me.  I will contact my provider if I have concerns about medicine side effects.  I understand that if I do not follow any of the conditions above, my prescriptions or treatment may be stopped.    I agree that my provider, clinic care team, and pharmacy may  work with any city, state or federal law enforcement agency that investigates the misuse, sale, or other diversion of my controlled medicine. I will allow my provider to discuss my care with or share a copy of this agreement with any other treating provider, pharmacy or emergency room where I receive care.  I agree to give up (waive) any right of privacy or confidentiality with respect to these authorizations.   I have read this agreement and have asked questions about anything I did not understand.   ___________________________________    ___________________________  Patient Signature                                                           Date and Time  ___________________________________     ____________________________  Witness                                                                            Date and Time  ___________________________________  Corin Villagran MD  REV-  04/2016                                                                                                                                                                 Page 2 of 2

## 2018-04-20 NOTE — NURSING NOTE
"Chief Complaint   Patient presents with     Physical       Initial /74 (BP Location: Left arm, Patient Position: Chair, Cuff Size: Adult Regular)  Pulse 79  Temp 98.1  F (36.7  C) (Oral)  Ht 5' 6\" (1.676 m)  Wt 121 lb 3.2 oz (55 kg)  LMP 04/01/2018 (Exact Date)  SpO2 98%  BMI 19.56 kg/m2 Estimated body mass index is 19.56 kg/(m^2) as calculated from the following:    Height as of this encounter: 5' 6\" (1.676 m).    Weight as of this encounter: 121 lb 3.2 oz (55 kg).  Medication Reconciliation: complete   Joann Castrejon CMA  "

## 2018-04-21 ASSESSMENT — ANXIETY QUESTIONNAIRES: GAD7 TOTAL SCORE: 9

## 2018-04-21 ASSESSMENT — PATIENT HEALTH QUESTIONNAIRE - PHQ9: SUM OF ALL RESPONSES TO PHQ QUESTIONS 1-9: 8

## 2018-04-23 ENCOUNTER — TRANSFERRED RECORDS (OUTPATIENT)
Dept: HEALTH INFORMATION MANAGEMENT | Facility: CLINIC | Age: 33
End: 2018-04-23

## 2018-04-24 LAB
COPATH REPORT: NORMAL
PAP: NORMAL

## 2018-04-26 LAB
FINAL DIAGNOSIS: NORMAL
HPV HR 12 DNA CVX QL NAA+PROBE: NEGATIVE
HPV16 DNA SPEC QL NAA+PROBE: NEGATIVE
HPV18 DNA SPEC QL NAA+PROBE: NEGATIVE
SPECIMEN DESCRIPTION: NORMAL
SPECIMEN SOURCE CVX/VAG CYTO: NORMAL

## 2018-05-31 DIAGNOSIS — R17 ELEVATED BILIRUBIN: Primary | ICD-10-CM

## 2018-05-31 DIAGNOSIS — Z00.01 ENCOUNTER FOR ROUTINE ADULT MEDICAL EXAM WITH ABNORMAL FINDINGS: ICD-10-CM

## 2018-05-31 LAB
ALBUMIN SERPL-MCNC: 4.3 G/DL (ref 3.4–5)
ALP SERPL-CCNC: 39 U/L (ref 40–150)
ALT SERPL W P-5'-P-CCNC: 25 U/L (ref 0–50)
ANION GAP SERPL CALCULATED.3IONS-SCNC: 8 MMOL/L (ref 3–14)
AST SERPL W P-5'-P-CCNC: 13 U/L (ref 0–45)
BILIRUB SERPL-MCNC: 1.6 MG/DL (ref 0.2–1.3)
BUN SERPL-MCNC: 12 MG/DL (ref 7–30)
CALCIUM SERPL-MCNC: 8.9 MG/DL (ref 8.5–10.1)
CHLORIDE SERPL-SCNC: 104 MMOL/L (ref 94–109)
CHOLEST SERPL-MCNC: 156 MG/DL
CO2 SERPL-SCNC: 27 MMOL/L (ref 20–32)
CREAT SERPL-MCNC: 0.74 MG/DL (ref 0.52–1.04)
ERYTHROCYTE [DISTWIDTH] IN BLOOD BY AUTOMATED COUNT: 11.6 % (ref 10–15)
GFR SERPL CREATININE-BSD FRML MDRD: 89 ML/MIN/1.7M2
GLUCOSE SERPL-MCNC: 81 MG/DL (ref 70–99)
HCT VFR BLD AUTO: 39.8 % (ref 35–47)
HDLC SERPL-MCNC: 64 MG/DL
HGB BLD-MCNC: 13.5 G/DL (ref 11.7–15.7)
LDLC SERPL CALC-MCNC: 80 MG/DL
MCH RBC QN AUTO: 31.5 PG (ref 26.5–33)
MCHC RBC AUTO-ENTMCNC: 33.9 G/DL (ref 31.5–36.5)
MCV RBC AUTO: 93 FL (ref 78–100)
NONHDLC SERPL-MCNC: 92 MG/DL
PLATELET # BLD AUTO: 178 10E9/L (ref 150–450)
POTASSIUM SERPL-SCNC: 4.1 MMOL/L (ref 3.4–5.3)
PROT SERPL-MCNC: 7.6 G/DL (ref 6.8–8.8)
RBC # BLD AUTO: 4.28 10E12/L (ref 3.8–5.2)
SODIUM SERPL-SCNC: 139 MMOL/L (ref 133–144)
TRIGL SERPL-MCNC: 59 MG/DL
TSH SERPL DL<=0.005 MIU/L-ACNC: 0.85 MU/L (ref 0.4–4)
WBC # BLD AUTO: 5.2 10E9/L (ref 4–11)

## 2018-05-31 PROCEDURE — 80061 LIPID PANEL: CPT | Performed by: FAMILY MEDICINE

## 2018-05-31 PROCEDURE — 36415 COLL VENOUS BLD VENIPUNCTURE: CPT | Performed by: FAMILY MEDICINE

## 2018-05-31 PROCEDURE — 85027 COMPLETE CBC AUTOMATED: CPT | Performed by: FAMILY MEDICINE

## 2018-05-31 PROCEDURE — 80053 COMPREHEN METABOLIC PANEL: CPT | Performed by: FAMILY MEDICINE

## 2018-05-31 PROCEDURE — 84443 ASSAY THYROID STIM HORMONE: CPT | Performed by: FAMILY MEDICINE

## 2018-06-13 ENCOUNTER — TELEPHONE (OUTPATIENT)
Dept: FAMILY MEDICINE | Facility: CLINIC | Age: 33
End: 2018-06-13

## 2018-06-13 ENCOUNTER — MYC MEDICAL ADVICE (OUTPATIENT)
Dept: FAMILY MEDICINE | Facility: CLINIC | Age: 33
End: 2018-06-13

## 2018-06-13 DIAGNOSIS — Z00.01 ENCOUNTER FOR ROUTINE ADULT HEALTH EXAMINATION WITH ABNORMAL FINDINGS: Primary | ICD-10-CM

## 2018-06-13 NOTE — TELEPHONE ENCOUNTER
I left detailed message for césar that I entered the hepatic lab  Kim Wagoner RN- Triage [employer]

## 2018-06-13 NOTE — TELEPHONE ENCOUNTER
Reason for Call: Request for an order or referral:    Order or referral being requested: The patient says she received a PHARMAJET message that Dr. Villagran wanted her to have followup labs in a month. This is scheduled for 06/29/2018 at 8:30 a.m. There are no orders in.    Date needed: before my next appointment    Has the patient been seen by the PCP for this problem? YES    Phone number Patient can be reached at:  Cell number on file:    Telephone Information:   Mobile [phone]     Best Time:  Anytime    Can we leave a detailed message on this number?  YES    Call taken on 6/13/2018 at 1:08 PM by Emy Sykes

## 2018-06-15 ENCOUNTER — OFFICE VISIT (OUTPATIENT)
Dept: FAMILY MEDICINE | Facility: CLINIC | Age: 33
End: 2018-06-15
Payer: COMMERCIAL

## 2018-06-15 VITALS — SYSTOLIC BLOOD PRESSURE: 118 MMHG | HEART RATE: 82 BPM | DIASTOLIC BLOOD PRESSURE: 76 MMHG | OXYGEN SATURATION: 97 %

## 2018-06-15 DIAGNOSIS — Z91.89 HISTORY OF SUN EXPOSURE, MODERATE: Primary | ICD-10-CM

## 2018-06-15 DIAGNOSIS — D22.9 MULTIPLE BENIGN NEVI: ICD-10-CM

## 2018-06-15 DIAGNOSIS — B35.6 TINEA CRURIS: ICD-10-CM

## 2018-06-15 DIAGNOSIS — Z80.8 FAMILY HISTORY OF BASAL CELL CARCINOMA: ICD-10-CM

## 2018-06-15 DIAGNOSIS — L81.4 LENTIGINES: ICD-10-CM

## 2018-06-15 PROCEDURE — 99214 OFFICE O/P EST MOD 30 MIN: CPT | Performed by: PHYSICIAN ASSISTANT

## 2018-06-15 NOTE — PATIENT INSTRUCTIONS
Apply cream to affected areas 2x a day for 4-6 weeks.      Proper skin care from [facility]:    -Eliminate harsh soaps as they strip the natural oils from the skin, often resulting in dry itchy skin ( i.e. Dial, Zest, Dagmar Spring)  -Use mild soaps such as Cetaphil or Dove Sensitive Skin in the shower. You do not need to use soap on arms, legs, and trunk every time you shower unless visibly soiled.   -Avoid hot or cold showers.  -After showering, lightly dry off and apply moisturizing within 2-3 minutes. This will help trap moisture in the skin.   -Aggressive use of a moisturizer at least 1-2 times a day to the entire body (including -Vanicream, Cetaphil, Aquaphor or Cerave) and moisturize hands after every washing.  -We recommend using moisturizers that come in a tub that needs to be scooped out, not a pump. This has more of an oil base. It will hold moisture in your skin much better than a water base moisturizer. The above recommended are non-pore clogging.           Wear a sunscreen with at least SPF 30 on your face, ears, neck and V of the chest daily. Wear sunscreen on other areas of the body if those areas are exposed to the sun throughout the day. Sunscreens can contain physical and/or chemical blockers. Physical blockers are less likely to clog pores, these include zinc oxide and titanium dioxide. Reapply every two hour and after swimming. Sunscreen examples include Neutrogena, CeraVe, Blue Lizard, Elta MD and many others.    UV radiation  UVA radiation remains constant throughout the day and throughout the year. It is a longer wavelength than UVB and therefore penetrates deeper into the skin leading to immediate and delayed tanning, photoaging, and skin cancer. 70-80% of UVA and UVB radiation occurs between the hours of 10am-2pm.  UVB radiation  UVB radiation causes the most harmful effects and is more significant during the summer months. However, snow and ice can reflect UVB radiation leading to  skin damage during the winter months as well. UVB radiation is responsible for tanning, burning, inflammation, delayed erythema (pinkness), pigmentation (brown spots), and skin cancer.   Just because you do not burn or are not developing a tan does not mean that you are not damaging your skin. A 15 minute drive to and from work for 30 years an lead to chronic sun damage of the skin. It is important to wear a broad spectrum (both UVA and UVB) sunscreen EVERY day with at least 30 SPF. Apply to face, ears, neck and v of the chest as this is where most of our sun exposure is. Reapply sunscreen every two hours if you plan on being outside.   Leonardo Vazquez. Clinical Dermatology: A Color Guide to Diagnosis and Therapy. Elsevier, 2016.

## 2018-06-15 NOTE — MR AVS SNAPSHOT
After Visit Summary   6/15/2018    Mary Alice Aragon    MRN: [id number]           Patient Information     Date Of Birth          1985        Visit Information        Provider Department      6/15/2018 1:00 PM Mirella Gusman PA-C [facility]        Care Instructions    Apply cream to affected areas 2x a day for 4-6 weeks.      Proper skin care from [facility]:    -Eliminate harsh soaps as they strip the natural oils from the skin, often resulting in dry itchy skin ( i.e. Dial, Zest, [place] Spring)  -Use mild soaps such as Cetaphil or Dove Sensitive Skin in the shower. You do not need to use soap on arms, legs, and trunk every time you shower unless visibly soiled.   -Avoid hot or cold showers.  -After showering, lightly dry off and apply moisturizing within 2-3 minutes. This will help trap moisture in the skin.   -Aggressive use of a moisturizer at least 1-2 times a day to the entire body (including -Vanicream, Cetaphil, Aquaphor or Cerave) and moisturize hands after every washing.  -We recommend using moisturizers that come in a tub that needs to be scooped out, not a pump. This has more of an oil base. It will hold moisture in your skin much better than a water base moisturizer. The above recommended are non-pore clogging.           Wear a sunscreen with at least SPF 30 on your face, ears, neck and V of the chest daily. Wear sunscreen on other areas of the body if those areas are exposed to the sun throughout the day. Sunscreens can contain physical and/or chemical blockers. Physical blockers are less likely to clog pores, these include zinc oxide and titanium dioxide. Reapply every two hour and after swimming. Sunscreen examples include Neutrogena, CeraVe, Blue Lizard, Elta MD and many others.    UV radiation  UVA radiation remains constant throughout the day and throughout the year. It is a longer wavelength than UVB and therefore penetrates deeper into the  skin leading to immediate and delayed tanning, photoaging, and skin cancer. 70-80% of UVA and UVB radiation occurs between the hours of 10am-2pm.  UVB radiation  UVB radiation causes the most harmful effects and is more significant during the summer months. However, snow and ice can reflect UVB radiation leading to skin damage during the winter months as well. UVB radiation is responsible for tanning, burning, inflammation, delayed erythema (pinkness), pigmentation (brown spots), and skin cancer.   Just because you do not burn or are not developing a tan does not mean that you are not damaging your skin. A 15 minute drive to and from work for 30 years an lead to chronic sun damage of the skin. It is important to wear a broad spectrum (both UVA and UVB) sunscreen EVERY day with at least 30 SPF. Apply to face, ears, neck and v of the chest as this is where most of our sun exposure is. Reapply sunscreen every two hours if you plan on being outside.   Leonardo Vazquez. Clinical Dermatology: A Color Guide to Diagnosis and Therapy. Elsevier, 2016.               Follow-ups after your visit        Your next 10 appointments already scheduled     Jun 29, 2018  8:30 AM CDT   LAB with RV LAB   [facility]    [address] [place] [id number] [phone]           Please do not eat 10-12 hours before your appointment if you are coming in fasting for labs on lipids, cholesterol, or glucose (sugar). This does not apply to pregnant women. Water, hot tea and black coffee (with nothing added) are okay. Do not drink other fluids, diet soda or chew gum.              Who to contact     If you have questions or need follow up information about today's clinic visit or your schedule please contact [facility] directly at [phone].  Normal or non-critical lab and imaging results will be communicated to you by MyChart, letter or phone within 4 business  days after the clinic has received the results. If you do not hear from us within 7 days, please contact the clinic through [employer] or phone. If you have a critical or abnormal lab result, we will notify you by phone as soon as possible.  Submit refill requests through [employer] or call your pharmacy and they will forward the refill request to us. Please allow 3 business days for your refill to be completed.          Additional Information About Your Visit        [employer] Information     [employer] gives you secure access to your electronic health record. If you see a primary care provider, you can also send messages to your care team and make appointments. If you have questions, please call your primary care clinic.  If you do not have a primary care provider, please call [phone] and they will assist you.        Care EveryWhere ID     This is your Care EveryWhere ID. This could be used by other organizations to access your [facility] medical records  [id number]        Your Vitals Were     Pulse Last Period Pulse Oximetry             82 05/26/2018 97%          Blood Pressure from Last 3 Encounters:   06/15/18 118/76   04/20/18 126/74   04/05/16 119/70    Weight from Last 3 Encounters:   04/20/18 121 lb 3.2 oz (55 kg)   04/04/16 153 lb (69.4 kg)   11/11/14 139 lb 14.4 oz (63.5 kg)              Today, you had the following     No orders found for display       Primary Care Provider Office Phone # Fax #    Corin Villagran [phone]       [address] [place] [id number]        Equal Access to Services     [facility] AH: Hadii aad ku hadasho Soomaali, waaxda luqadaha, qaybta kaalmada adeegyada, bennett adkins . So [facility] [phone].    ATENCIÓN: Si habla español, tiene a espinoza disposición servicios gratuitos de asistencia lingüística. Llame al [phone].    We comply with applicable federal civil rights laws and [place] laws. We do not discriminate on the  basis of race, color, national origin, age, disability, sex, sexual orientation, or gender identity.            Thank you!     Thank you for choosing [facility]  for your care. Our goal is always to provide you with excellent care. Hearing back from our patients is one way we can continue to improve our services. Please take a few minutes to complete the written survey that you may receive in the mail after your visit with us. Thank you!             Your Updated Medication List - Protect others around you: Learn how to safely use, store and throw away your medicines at www.disposemymeds.org.          This list is accurate as of 6/15/18  1:02 PM.  Always use your most recent med list.                   Brand Name Dispense Instructions for use Diagnosis    ALPRAZolam 1 MG tablet    XANAX    30 tablet    Take 1 tablet (1 mg) by mouth 3 times daily as needed for anxiety    Other mixed anxiety disorders, Other specified anxiety disorders, Controlled substance agreement signed, Adjustment disorder with mixed anxiety and depressed mood       busPIRone 10 MG tablet    BUSPAR    90 tablet    Take 2 tablets (20 mg) by mouth daily    Major depressive disorder, recurrent episode, moderate (H), Other mixed anxiety disorders, Other specified anxiety disorders, Adjustment disorder with mixed anxiety and depressed mood       citalopram 40 MG tablet    celeXA    90 tablet    Take 1 tablet (40 mg) by mouth daily    Other specified anxiety disorders       Krill Oil 1000 MG Caps      Take 1,000 mg by mouth 2 times daily

## 2018-06-15 NOTE — PROGRESS NOTES
HPI:  Mary Alice Aragon is a 33 year old year old female patient here today for full body exam. She has had a rash on left groin area x a few months on and off.   Symptoms:  Denies itching and pain. Doesn't seem to spread     Previous treatments: none     Alleviating/aggravating factors: none    Associated symptoms: none  Additional findings:had a few moles removed in the past. All benign.   Patient has no other skin complaints today.  Remainder of the HPI, Meds, PMH, Allergies, FH, and SH was reviewed in chart.    Pertinent Hx:   Father had BCC  Past Medical History:   Diagnosis Date     Anxiety disorder 2009    with strong component of GI symptoms     Bronchospasm     bronchospasm triggered by exercise, smoking     Former smoker     quit 7-2011 with a 6-pack-year history     Major depressive disorder, recurrent episode, moderate (H) 2000/10    was treated with Effexor, saw Dr. Polanco, h/o suicide attempt asa/tylenol 5/00, tylenol 11/00,self mutilation.  Bipolar diagnosis entertained     Mixed hyperlipidemia      Obesity     resolved 2012- previously had increased bronchospasm when she was 100lbs heavier      Other mixed anxiety disorders 04/20/2018     Recurrent UTI     saw urol tx'd with flomax       Past Surgical History:   Procedure Laterality Date     CT SCAN ABDOMEN/PELVIS  12/2009    normal     DILATION AND CURETTAGE SUCTION N/A 11/11/2014    Procedure: DILATION AND CURETTAGE SUCTION;  Surgeon: Adelaida Lee MD;  Location: [facility] ESOPHAGOSCOPY, DIAGNOSTIC  12/2009    normal, negative bx for sprue and H. pylori     HC US ABDOMEN COMPLETE  12/2009    normal     NO HISTORY OF SURGERY          Family History   Problem Relation Age of Onset     Hypertension Mother      Lipids Mother      Thyroid Disease Mother      hypothyroidism     Lipids Father      Psychotic Disorder Paternal Grandmother      depression/anxiety     Hypertension Paternal Grandfather      Osteoarthritis Maternal  Grandmother      DIABETES Maternal Grandmother      type 2     CANCER Maternal Grandmother      cancer in the neck, nonsmoker     Hypertension Maternal Grandmother      CANCER Maternal Grandfather      lung, smoker     CANCER Paternal Aunt      brain, early 40's       Social History     Social History     Marital status:      Spouse name: Roberto     Number of children: 0     Years of education: 14     Occupational History      [employer]     Social History Main Topics     Smoking status: Former Smoker     Packs/day: 0.50     Years: 12.00     Types: Cigarettes     Quit date: 7/31/2011     Smokeless tobacco: Never Used      Comment: Quit 07/2011     Alcohol use Yes      Comment: 2 beers weekly     Drug use: Yes     Special: Marijuana      Comment: Pt states she did marijuana a month ago     Sexual activity: Yes     Partners: Male     Birth control/ protection: Pill      Comment: same partner since 2004-  Roberto     Other Topics Concern      Service No     Blood Transfusions No     Caffeine Concern No     Occupational Exposure No     Hobby Hazards No     Sleep Concern No     Stress Concern No     Weight Concern Yes     Special Diet Yes     yogurt 1-2 days per week     Back Care No     Exercise Yes     elliptical 30 min 2-3 times per week     Bike Helmet No     Seat Belt Yes     Self-Exams No     Social History Narrative    Lives with ,  July 2012.  Has a cat.       Outpatient Encounter Prescriptions as of 6/15/2018   Medication Sig Dispense Refill     ALPRAZolam (XANAX) 1 MG tablet Take 1 tablet (1 mg) by mouth 3 times daily as needed for anxiety 30 tablet 1     busPIRone (BUSPAR) 10 MG tablet Take 2 tablets (20 mg) by mouth daily 90 tablet 0     citalopram (CELEXA) 40 MG tablet Take 1 tablet (40 mg) by mouth daily 90 tablet 3     Krill Oil 1000 MG CAPS Take 1,000 mg by mouth 2 times daily       No facility-administered encounter medications on file as of 6/15/2018.         Review Of Systems:  Skin: As above  Eyes: negative  Ears/Nose/Throat: negative  Respiratory: No shortness of breath, dyspnea on exertion, cough, or hemoptysis  Cardiovascular: negative  Gastrointestinal: negative  Genitourinary: negative  Musculoskeletal: negative  Neurologic: negative  Psychiatric: negative  Hematologic/Lymphatic/Immunologic: negative  Endocrine: negative      Objective:     /76  Pulse 82  LMP 05/26/2018  SpO2 97%  Eyes: Conjunctivae/lids: Normal   ENT: Lips:  Normal  MSK: Normal  Cardiovascular: Peripheral edema none  Pulm: Breathing Normal  Neuro/Psych: Orientation: Normal; Mood/Affect: Normal, NAD, WDWN  Following areas examined:   Scalp, face, eyelids, lips, neck, chest, abdomen, back, buttocks, and R&L upper and lower extremities.      Findings:    1)Tan WD smooth macules on face, neck, trunk, and extremities.  2)Well circumscribed macules with symmetric color distribution on trunk and extremities 2-5mm.  3)WD pink scaly patches on left groin and proximal medial thigh      Assessment and Plan:  1-2) lentigines and benign nevi  Treatment of these lesions would be purely cosmetic and not medically neccessary.   Different removal options including excision, cryotherapy, cautery and /or laser.    3) favor tinea cruris  Disc tx. Pt has an OTC topical antifungal at home that she would like to use. Defers RX  Apply OTC BID x 4-6 weeks. Call if no resolution and I will sent topical antifungal.  4)  History of sun exposure  Wear a sunscreen with at least SPF 30 on your face, ears, neck and V of the chest daily. Wear sunscreen on other areas of the body if those areas are exposed to the sun throughout the day. Sunscreens can contain physical and/or chemical blockers. Physical blockers are less likely to clog pores, these include zinc oxide and titanium dioxide. Reapply every two hour and after swimming. Sunscreen examples include Neutrogena, CeraVe, Blue Lizard, Elta MD and many  others.    UV radiation  UVA radiation remains constant throughout the day and throughout the year. It is a longer wavelength than UVB and therefore penetrates deeper into the skin leading to immediate and delayed tanning, photoaging, and skin cancer. 70-80% of UVA and UVB radiation occurs between the hours of 10am-2pm.  UVB radiation  UVB radiation causes the most harmful effects and is more significant during the summer months. However, snow and ice can reflect UVB radiation leading to skin damage during the winter months as well. UVB radiation is responsible for tanning, burning, inflammation, delayed erythema (pinkness), pigmentation (brown spots), and skin cancer.   Just because you do not burn or are not developing a tan does not mean that you are not damaging your skin. A 15 minute drive to and from work for 30 years an lead to chronic sun damage of the skin. It is important to wear a broad spectrum (both UVA and UVB) sunscreen EVERY day with at least 30 SPF. Apply to face, ears, neck and v of the chest as this is where most of our sun exposure is. Reapply sunscreen every two hours if you plan on being outside.   Leonardo aVzquez. Clinical Dermatology: A Color Guide to Diagnosis and Therapy. Elsevier, 2016.   5) Family history of BCC  Signs and Symptoms of non-melanoma skin cancer and ABCDEs of melanoma reviewed with patient. Patient encouraged to perform monthly self skin exams. UV precautions reviewed with patient.         Follow up in 1-2 years for FBE. Sooner if changes.

## 2018-06-15 NOTE — LETTER
6/15/2018         RE: Mary Alice Aragon  [address] [place] [address]        Dear Colleague,    Thank you for referring your patient, Mary Alice Aragon, to the [facility]. Please see a copy of my visit note below.    HPI:  Mary Alice Aragon is a 33 year old year old female patient here today for full body exam. She has had a rash on left groin area x a few months on and off.   Symptoms:  Denies itching and pain. Doesn't seem to spread     Previous treatments: none     Alleviating/aggravating factors: none    Associated symptoms: none  Additional findings:had a few moles removed in the past. All benign.   Patient has no other skin complaints today.  Remainder of the HPI, Meds, PMH, Allergies, FH, and SH was reviewed in chart.    Pertinent Hx:   Father had BCC  Past Medical History:   Diagnosis Date     Anxiety disorder 2009    with strong component of GI symptoms     Bronchospasm     bronchospasm triggered by exercise, smoking     Former smoker     quit 7-2011 with a 6-pack-year history     Major depressive disorder, recurrent episode, moderate (H) 2000/10    was treated with Effexor, saw Dr. Polanco, h/o suicide attempt asa/tylenol 5/00, tylenol 11/00,self mutilation.  Bipolar diagnosis entertained     Mixed hyperlipidemia      Obesity     resolved 2012- previously had increased bronchospasm when she was 100lbs heavier      Other mixed anxiety disorders 04/20/2018     Recurrent UTI     saw urol tx'd with flomax       Past Surgical History:   Procedure Laterality Date     CT SCAN ABDOMEN/PELVIS  12/2009    normal     DILATION AND CURETTAGE SUCTION N/A 11/11/2014    Procedure: DILATION AND CURETTAGE SUCTION;  Surgeon: Adelaida Lee MD;  Location: [facility] ESOPHAGOSCOPY, DIAGNOSTIC  12/2009    normal, negative bx for sprue and H. pylori     HC US ABDOMEN COMPLETE  12/2009    normal     NO HISTORY OF SURGERY          Family History   Problem Relation Age of Onset      Hypertension Mother      Lipids Mother      Thyroid Disease Mother      hypothyroidism     Lipids Father      Psychotic Disorder Paternal Grandmother      depression/anxiety     Hypertension Paternal Grandfather      Osteoarthritis Maternal Grandmother      DIABETES Maternal Grandmother      type 2     CANCER Maternal Grandmother      cancer in the neck, nonsmoker     Hypertension Maternal Grandmother      CANCER Maternal Grandfather      lung, smoker     CANCER Paternal Aunt      brain, early 40's       Social History     Social History     Marital status:      Spouse name: Roberto     Number of children: 0     Years of education: 14     Occupational History      [employer]     Social History Main Topics     Smoking status: Former Smoker     Packs/day: 0.50     Years: 12.00     Types: Cigarettes     Quit date: 7/31/2011     Smokeless tobacco: Never Used      Comment: Quit 07/2011     Alcohol use Yes      Comment: 2 beers weekly     Drug use: Yes     Special: Marijuana      Comment: Pt states she did marijuana a month ago     Sexual activity: Yes     Partners: Male     Birth control/ protection: Pill      Comment: same partner since 2004-  Roberto     Other Topics Concern      Service No     Blood Transfusions No     Caffeine Concern No     Occupational Exposure No     Hobby Hazards No     Sleep Concern No     Stress Concern No     Weight Concern Yes     Special Diet Yes     yogurt 1-2 days per week     Back Care No     Exercise Yes     elliptical 30 min 2-3 times per week     Bike Helmet No     Seat Belt Yes     Self-Exams No     Social History Narrative    Lives with ,  July 2012.  Has a cat.       Outpatient Encounter Prescriptions as of 6/15/2018   Medication Sig Dispense Refill     ALPRAZolam (XANAX) 1 MG tablet Take 1 tablet (1 mg) by mouth 3 times daily as needed for anxiety 30 tablet 1     busPIRone (BUSPAR) 10 MG tablet Take 2 tablets (20 mg) by mouth daily  90 tablet 0     citalopram (CELEXA) 40 MG tablet Take 1 tablet (40 mg) by mouth daily 90 tablet 3     Krill Oil 1000 MG CAPS Take 1,000 mg by mouth 2 times daily       No facility-administered encounter medications on file as of 6/15/2018.        Review Of Systems:  Skin: As above  Eyes: negative  Ears/Nose/Throat: negative  Respiratory: No shortness of breath, dyspnea on exertion, cough, or hemoptysis  Cardiovascular: negative  Gastrointestinal: negative  Genitourinary: negative  Musculoskeletal: negative  Neurologic: negative  Psychiatric: negative  Hematologic/Lymphatic/Immunologic: negative  Endocrine: negative      Objective:     /76  Pulse 82  LMP 05/26/2018  SpO2 97%  Eyes: Conjunctivae/lids: Normal   ENT: Lips:  Normal  MSK: Normal  Cardiovascular: Peripheral edema none  Pulm: Breathing Normal  Neuro/Psych: Orientation: Normal; Mood/Affect: Normal, NAD, WDWN  Following areas examined:   Scalp, face, eyelids, lips, neck, chest, abdomen, back, buttocks, and R&L upper and lower extremities.      Findings:    1)Tan WD smooth macules on face, neck, trunk, and extremities.  2)Well circumscribed macules with symmetric color distribution on trunk and extremities 2-5mm.  3)WD pink scaly patches on left groin and proximal medial thigh      Assessment and Plan:  1-2) lentigines and benign nevi  Treatment of these lesions would be purely cosmetic and not medically neccessary.   Different removal options including excision, cryotherapy, cautery and /or laser.    3) favor tinea cruris  Disc tx. Pt has an OTC topical antifungal at home that she would like to use. Defers RX  Apply OTC BID x 4-6 weeks. Call if no resolution and I will sent topical antifungal.  4)  History of sun exposure  Wear a sunscreen with at least SPF 30 on your face, ears, neck and V of the chest daily. Wear sunscreen on other areas of the body if those areas are exposed to the sun throughout the day. Sunscreens can contain physical and/or  chemical blockers. Physical blockers are less likely to clog pores, these include zinc oxide and titanium dioxide. Reapply every two hour and after swimming. Sunscreen examples include Neutrogena, CeraVe, Blue Lizard, Elta MD and many others.    UV radiation  UVA radiation remains constant throughout the day and throughout the year. It is a longer wavelength than UVB and therefore penetrates deeper into the skin leading to immediate and delayed tanning, photoaging, and skin cancer. 70-80% of UVA and UVB radiation occurs between the hours of 10am-2pm.  UVB radiation  UVB radiation causes the most harmful effects and is more significant during the summer months. However, snow and ice can reflect UVB radiation leading to skin damage during the winter months as well. UVB radiation is responsible for tanning, burning, inflammation, delayed erythema (pinkness), pigmentation (brown spots), and skin cancer.   Just because you do not burn or are not developing a tan does not mean that you are not damaging your skin. A 15 minute drive to and from work for 30 years an lead to chronic sun damage of the skin. It is important to wear a broad spectrum (both UVA and UVB) sunscreen EVERY day with at least 30 SPF. Apply to face, ears, neck and v of the chest as this is where most of our sun exposure is. Reapply sunscreen every two hours if you plan on being outside.   Leonardo Vazquez. Clinical Dermatology: A Color Guide to Diagnosis and Therapy. Elsevier, 2016.   5) Family history of BCC  Signs and Symptoms of non-melanoma skin cancer and ABCDEs of melanoma reviewed with patient. Patient encouraged to perform monthly self skin exams. UV precautions reviewed with patient.         Follow up in 1-2 years for FBE. Sooner if changes.       Again, thank you for allowing me to participate in the care of your patient.        Sincerely,        Mirella Gusman PA-C

## 2018-06-29 DIAGNOSIS — R17 ELEVATED BILIRUBIN: Primary | ICD-10-CM

## 2018-06-29 DIAGNOSIS — Z00.01 ENCOUNTER FOR ROUTINE ADULT HEALTH EXAMINATION WITH ABNORMAL FINDINGS: ICD-10-CM

## 2018-06-29 LAB
ALBUMIN SERPL-MCNC: 4.4 G/DL (ref 3.4–5)
ALP SERPL-CCNC: 39 U/L (ref 40–150)
ALT SERPL W P-5'-P-CCNC: 24 U/L (ref 0–50)
AST SERPL W P-5'-P-CCNC: 16 U/L (ref 0–45)
BILIRUB DIRECT SERPL-MCNC: 0.3 MG/DL (ref 0–0.2)
BILIRUB SERPL-MCNC: 1.6 MG/DL (ref 0.2–1.3)
PROT SERPL-MCNC: 7.6 G/DL (ref 6.8–8.8)

## 2018-06-29 PROCEDURE — 80076 HEPATIC FUNCTION PANEL: CPT | Performed by: FAMILY MEDICINE

## 2018-06-29 PROCEDURE — 36415 COLL VENOUS BLD VENIPUNCTURE: CPT | Performed by: FAMILY MEDICINE

## 2018-07-05 DIAGNOSIS — R17 ELEVATED BILIRUBIN: Primary | ICD-10-CM

## 2018-07-05 NOTE — PROGRESS NOTES
Please call pt with results below:     Normal liver functions except for biliirubin still high, but stable from last month and direct bilirubin from the liver = 1/10th of a point over normal. Alk phos = a little low, which is ok . With low alk phos and slightly high bilirubin this suggests perhaps a benign elevation in bilirubin due to a mild  stress on the bile duct system.  Recommend low fat diet, upper abdominal ultrasound dx: elevated bilirubin.    ? Any signs of yellowing of the skin or eyes, generalized itching of the skin or other signs of bilirubin issues - ? Yellow or chalky colored stools?     For additional lab test information, labtestsonline.org is an excellent reference.

## 2018-07-17 ENCOUNTER — HOSPITAL ENCOUNTER (OUTPATIENT)
Dept: ULTRASOUND IMAGING | Facility: CLINIC | Age: 33
Discharge: HOME | End: 2018-07-17
Attending: FAMILY MEDICINE | Admitting: FAMILY MEDICINE
Payer: COMMERCIAL

## 2018-07-17 DIAGNOSIS — R17 ELEVATED BILIRUBIN: ICD-10-CM

## 2018-07-17 PROCEDURE — 76700 US EXAM ABDOM COMPLETE: CPT

## 2018-07-19 ENCOUNTER — TELEPHONE (OUTPATIENT)
Dept: FAMILY MEDICINE | Facility: CLINIC | Age: 33
End: 2018-07-19

## 2018-07-19 DIAGNOSIS — R17 ELEVATED BILIRUBIN: Primary | ICD-10-CM

## 2018-07-19 NOTE — TELEPHONE ENCOUNTER
Clinic Action Needed:No/FYI only  Reason for Call: Mary Alice returned clinic call.  Informed Mary Alice that per Dr. 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  It would fit with her current pattern and normal imaging.  Advised to watch for sxs of jaundice (yellow in whites of eyes or tinged skin), yellow or whitish chalky stools or itchy skin.  Contact clinic if any symptoms present.  Mary Alice appears to understand directives and agrees with plan.     Routed to:  Triage Pool    Camille Rojas, RN  [facility]

## 2018-07-30 DIAGNOSIS — R17 ELEVATED BILIRUBIN: ICD-10-CM

## 2018-07-30 LAB
ALBUMIN SERPL-MCNC: 3.9 G/DL (ref 3.4–5)
ALP SERPL-CCNC: 46 U/L (ref 40–150)
ALT SERPL W P-5'-P-CCNC: 27 U/L (ref 0–50)
AST SERPL W P-5'-P-CCNC: 15 U/L (ref 0–45)
BILIRUB DIRECT SERPL-MCNC: 0.2 MG/DL (ref 0–0.2)
BILIRUB SERPL-MCNC: 0.6 MG/DL (ref 0.2–1.3)
PROT SERPL-MCNC: 7 G/DL (ref 6.8–8.8)

## 2018-07-30 PROCEDURE — 36415 COLL VENOUS BLD VENIPUNCTURE: CPT | Performed by: FAMILY MEDICINE

## 2018-07-30 PROCEDURE — 80076 HEPATIC FUNCTION PANEL: CPT | Performed by: FAMILY MEDICINE

## 2019-05-06 DIAGNOSIS — F41.8 OTHER SPECIFIED ANXIETY DISORDERS: ICD-10-CM

## 2019-05-07 NOTE — TELEPHONE ENCOUNTER
"Requested Prescriptions   Pending Prescriptions Disp Refills     citalopram (CELEXA) 40 MG tablet [Pharmacy Med Name: CITALOPRAM HBR 40 MG TABLET]      Last Written Prescription Date:  4.20.18  Last Fill Quantity: 90 tablet,  # refills: 3   Last office visit: 6/15/2018 with prescribing provider:  Corin Villagran MD       Future Office Visit:       90 tablet 2     Sig: TAKE 1 TABLET (40 MG) BY MOUTH DAILY       SSRIs Protocol Failed - 5/6/2019  1:17 PM        Failed - Recent (12 mo) or future (30 days) visit within the authorizing provider's specialty     Patient had office visit in the last 12 months or has a visit in the next 30 days with authorizing provider or within the authorizing provider's specialty.  See \"Patient Info\" tab in inbasket, or \"Choose Columns\" in Meds & Orders section of the refill encounter.        PHQ-9 SCORE 1/23/2013 4/30/2014 4/20/2018   PHQ-9 Total Score 0 14 -   PHQ-9 Total Score - - 8     SHANICE-7 SCORE 1/23/2013 4/30/2014 4/20/2018   Total Score 1 18 -   Total Score - - 9                   Passed - Medication is active on med list        Passed - Patient is age 18 or older        Passed - No active pregnancy on record        Passed - No positive pregnancy test in last 12 months        "

## 2019-05-08 ENCOUNTER — NURSE TRIAGE (OUTPATIENT)
Dept: NURSING | Facility: CLINIC | Age: 34
End: 2019-05-08

## 2019-05-08 RX ORDER — CITALOPRAM HYDROBROMIDE 40 MG/1
40 TABLET ORAL DAILY
Qty: 90 TABLET | Refills: 0 | Status: SHIPPED | OUTPATIENT
Start: 2019-05-08 | End: 2019-08-04

## 2019-05-08 NOTE — TELEPHONE ENCOUNTER
Clinic Action Needed: Yes, Patient is requesting refill of Celexa as soon as possible.  Reason for Call:Patient requesting refill of Celexa reporting she has 1 dose remaining.   Patient has scheduled appointment for  7/22/19.    EULA Heller RN  [facility]      [phone]

## 2019-05-08 NOTE — TELEPHONE ENCOUNTER
"Requested Prescriptions   Pending Prescriptions Disp Refills     citalopram (CELEXA) 40 MG tablet [Pharmacy Med Name: CITALOPRAM HBR 40 MG TABLET] 90 tablet 2     Sig: TAKE 1 TABLET (40 MG) BY MOUTH DAILY       SSRIs Protocol Failed - 5/8/2019  2:03 PM        Failed - Recent (12 mo) or future (30 days) visit within the authorizing provider's specialty     Patient had office visit in the last 12 months or has a visit in the next 30 days with authorizing provider or within the authorizing provider's specialty.  See \"Patient Info\" tab in inbasket, or \"Choose Columns\" in Meds & Orders section of the refill encounter.              Passed - Medication is active on med list        Passed - Patient is age 18 or older        Passed - No active pregnancy on record        Passed - No positive pregnancy test in last 12 months        Unable to LM. Sent surveys over [employer]    LOV 6/2018. Pt due for surveys as well.      Gloria Portillo RN  [facility]      "

## 2019-05-08 NOTE — TELEPHONE ENCOUNTER
PHQ-9 SCORE 4/30/2014 4/20/2018 5/8/2019   PHQ-9 Total Score 14 - -   PHQ-9 Total Score MyChart - - 1 (Minimal depression)   PHQ-9 Total Score - 8 1     SHANICE-7 SCORE 4/30/2014 4/20/2018 5/8/2019   Total Score 18 - -   Total Score - - 1 (minimal anxiety)   Total Score - 9 1       Prescription approved per [facility] Refill Protocol.  Gloria Portillo RN  [facility]

## 2019-05-08 NOTE — TELEPHONE ENCOUNTER
Clinic Action Needed: Yes, Patient is requesting refill of Celexa  Reason for Call:Patient requesting refill of Celexa reporting she has 1 dose remaining.   Patient has scheduled appointment for  7/22/19.    EULA Heller RN  [facility]

## 2019-08-04 DIAGNOSIS — F41.8 OTHER SPECIFIED ANXIETY DISORDERS: ICD-10-CM

## 2019-08-05 RX ORDER — CITALOPRAM HYDROBROMIDE 40 MG/1
40 TABLET ORAL DAILY
Qty: 90 TABLET | Refills: 0 | Status: SHIPPED | OUTPATIENT
Start: 2019-08-05 | End: 2019-10-31

## 2019-08-05 NOTE — TELEPHONE ENCOUNTER
"Requested Prescriptions   Pending Prescriptions Disp Refills     citalopram (CELEXA) 40 MG tablet [Pharmacy Med Name: CITALOPRAM HBR 40  Last Written Prescription Date:  5/8/19  Last Fill Quantity: 90,  # refills: 0   Last Office Visit: 6/15/2018   Future Office Visit:    Next 5 appointments (look out 90 days)    Oct 14, 2019  9:20 AM CDT  PHYS[facility] with Corin [facility]ran [facility] [address] [place] [phone] [phone]          MG TABLET] 90 tablet 0     Sig: TAKE 1 TABLET (40 MG) BY MOUTH DAILY       SSRIs Protocol Failed - 8/4/2019  8:45 AM   PHQ-9 SCORE 4/30/2014 4/20/2018 5/8/2019   PHQ-9 Total Score 14 - -   PHQ-9 Total Score MyChart - - 1 (Minimal depression)   PHQ-9 Total Score - 8 1     SHANICE-7 SCORE 4/30/2014 4/20/2018 5/8/2019   Total Score 18 - -   Total Score - - 1 (minimal anxiety)   Total Score - 9 1        Failed - Recent (12 mo) or future (30 days) visit within the authorizing provider's specialty     Patient had office visit in the last 12 months or has a visit in the next 30 days with authorizing provider or within the authorizing provider's specialty.  See \"Patient Info\" tab in inbasket, or \"Choose Columns\" in Meds & Orders section of the refill encounter.            Passed - Medication is active on med list        Passed - Patient is age 18 or older        Passed - No active pregnancy on record        Passed - No positive pregnancy test in last 12 months          "

## 2019-08-05 NOTE — TELEPHONE ENCOUNTER
PX scheduled for 10/14/2019  Medication is being filled for 1 time refill only due to:  Patient needs to be seen because it has been more than one year since last visit.    Mirella Lopez RN  [facility]

## 2019-10-31 DIAGNOSIS — F41.8 OTHER SPECIFIED ANXIETY DISORDERS: ICD-10-CM

## 2019-10-31 NOTE — TELEPHONE ENCOUNTER
Routing refill request to provider for review/approval because:  Patient needs to be seen because it has been more than 1 year since last office visit.    Patient does have a PX scheduled for 01/15/2020 (first available) - OK for refills until then?      Mirella Lopez RN  [facility]

## 2019-10-31 NOTE — TELEPHONE ENCOUNTER
"Requested Prescriptions   Pending Prescriptions Disp Refills     citalopram (CELEXA) 40 MG tablet [Pharmacy Med Name: CITALOPRAM HBR 40 MG TABLET] 90 tablet 0     Sig: TAKE 1 TABLET BY MOUTH EVERY DAY. DUE FOR APPT.       Last Written Prescription Date:  8/5/2019  Last Fill Quantity: 90,  # refills: 0   Last office visit: 6/15/2018 with prescribing provider:     Future Office Visit:   Next 5 appointments (look out 90 days)    Kuldeep 15, 2020 10:20 AM CST  [facility] [address] [place] [id number]  [phone]               SSRIs Protocol Failed - 10/31/2019  4:41 AM        Failed - Recent (12 mo) or future (30 days) visit within the authorizing provider's specialty     Patient has had an office visit with the authorizing provider or a provider within the authorizing providers department within the previous 12 mos or has a future within next 30 days. See \"Patient Info\" tab in inbasket, or \"Choose Columns\" in Meds & Orders section of the refill encounter.              Passed - Medication is active on med list        Passed - Patient is age 18 or older        Passed - No active pregnancy on record        Passed - No positive pregnancy test in last 12 months        "

## 2019-11-01 RX ORDER — CITALOPRAM HYDROBROMIDE 40 MG/1
40 TABLET ORAL DAILY
Qty: 90 TABLET | Refills: 0 | Status: SHIPPED | OUTPATIENT
Start: 2019-11-01

## 2019-11-01 NOTE — TELEPHONE ENCOUNTER
PHQ-9 SCORE 4/30/2014 4/20/2018 5/8/2019   PHQ-9 Total Score 14 - -   PHQ-9 Total Score MyChart - - 1 (Minimal depression)   PHQ-9 Total Score - 8 1     SHANICE-7 SCORE 4/30/2014 4/20/2018 5/8/2019   Total Score 18 - -   Total Score - - 1 (minimal anxiety)   Total Score - 9 1     Next 5 appointments (look out 90 days)    Kuldeep 15, 2020 10:20 AM CST  PHYSICAL with [facility]ran [facility]  [facility] [address] [place] [id number] [phone]        Ok to refill until pt is seen in 1/15/2020.

## 2022-03-16 ENCOUNTER — APPOINTMENT (OUTPATIENT)
Dept: ULTRASOUND IMAGING | Facility: CLINIC | Age: 37
End: 2022-03-16
Attending: EMERGENCY MEDICINE
Payer: COMMERCIAL

## 2022-03-16 ENCOUNTER — HOSPITAL ENCOUNTER (EMERGENCY)
Facility: CLINIC | Age: 37
Discharge: HOME | End: 2022-03-17
Attending: EMERGENCY MEDICINE | Admitting: EMERGENCY MEDICINE
Payer: COMMERCIAL

## 2022-03-16 DIAGNOSIS — O21.0 HYPEREMESIS GRAVIDARUM: ICD-10-CM

## 2022-03-16 LAB
ALBUMIN SERPL-MCNC: 4.2 G/DL (ref 3.4–5)
ALP SERPL-CCNC: 37 U/L (ref 40–150)
ALT SERPL W P-5'-P-CCNC: 21 U/L (ref 0–50)
ANION GAP SERPL CALCULATED.3IONS-SCNC: 8 MMOL/L (ref 3–14)
AST SERPL W P-5'-P-CCNC: 15 U/L (ref 0–45)
B-HCG SERPL-ACNC: ABNORMAL IU/L (ref 0–5)
BASOPHILS # BLD AUTO: 0 10E3/UL (ref 0–0.2)
BASOPHILS NFR BLD AUTO: 0 %
BILIRUB SERPL-MCNC: 2.4 MG/DL (ref 0.2–1.3)
BUN SERPL-MCNC: 12 MG/DL (ref 7–30)
CALCIUM SERPL-MCNC: 9.7 MG/DL (ref 8.5–10.1)
CHLORIDE BLD-SCNC: 105 MMOL/L (ref 94–109)
CO2 SERPL-SCNC: 21 MMOL/L (ref 20–32)
CREAT SERPL-MCNC: 0.6 MG/DL (ref 0.52–1.04)
EOSINOPHIL # BLD AUTO: 0 10E3/UL (ref 0–0.7)
EOSINOPHIL NFR BLD AUTO: 0 %
ERYTHROCYTE [DISTWIDTH] IN BLOOD BY AUTOMATED COUNT: 11.9 % (ref 10–15)
GFR SERPL CREATININE-BSD FRML MDRD: >90 ML/MIN/1.73M2
GLUCOSE BLD-MCNC: 111 MG/DL (ref 70–99)
HCT VFR BLD AUTO: 41.2 % (ref 35–47)
HGB BLD-MCNC: 13.7 G/DL (ref 11.7–15.7)
HOLD SPECIMEN: NORMAL
HOLD SPECIMEN: NORMAL
IMM GRANULOCYTES # BLD: 0 10E3/UL
IMM GRANULOCYTES NFR BLD: 0 %
LYMPHOCYTES # BLD AUTO: 0.9 10E3/UL (ref 0.8–5.3)
LYMPHOCYTES NFR BLD AUTO: 8 %
MCH RBC QN AUTO: 30.6 PG (ref 26.5–33)
MCHC RBC AUTO-ENTMCNC: 33.3 G/DL (ref 31.5–36.5)
MCV RBC AUTO: 92 FL (ref 78–100)
MONOCYTES # BLD AUTO: 0.3 10E3/UL (ref 0–1.3)
MONOCYTES NFR BLD AUTO: 2 %
NEUTROPHILS # BLD AUTO: 10 10E3/UL (ref 1.6–8.3)
NEUTROPHILS NFR BLD AUTO: 90 %
NRBC # BLD AUTO: 0 10E3/UL
NRBC BLD AUTO-RTO: 0 /100
PLATELET # BLD AUTO: 220 10E3/UL (ref 150–450)
POTASSIUM BLD-SCNC: 3.5 MMOL/L (ref 3.4–5.3)
PROT SERPL-MCNC: 8 G/DL (ref 6.8–8.8)
RBC # BLD AUTO: 4.48 10E6/UL (ref 3.8–5.2)
SODIUM SERPL-SCNC: 134 MMOL/L (ref 133–144)
WBC # BLD AUTO: 11.3 10E3/UL (ref 4–11)

## 2022-03-16 PROCEDURE — 99285 EMERGENCY DEPT VISIT HI MDM: CPT | Mod: 25

## 2022-03-16 PROCEDURE — 258N000003 HC RX IP 258 OP 636: Performed by: EMERGENCY MEDICINE

## 2022-03-16 PROCEDURE — 36415 COLL VENOUS BLD VENIPUNCTURE: CPT | Performed by: PHYSICIAN ASSISTANT

## 2022-03-16 PROCEDURE — 85025 COMPLETE CBC W/AUTO DIFF WBC: CPT | Performed by: PHYSICIAN ASSISTANT

## 2022-03-16 PROCEDURE — 96361 HYDRATE IV INFUSION ADD-ON: CPT

## 2022-03-16 PROCEDURE — 80053 COMPREHEN METABOLIC PANEL: CPT | Performed by: PHYSICIAN ASSISTANT

## 2022-03-16 PROCEDURE — 76705 ECHO EXAM OF ABDOMEN: CPT

## 2022-03-16 PROCEDURE — 250N000011 HC RX IP 250 OP 636: Performed by: EMERGENCY MEDICINE

## 2022-03-16 PROCEDURE — 250N000011 HC RX IP 250 OP 636: Performed by: PHYSICIAN ASSISTANT

## 2022-03-16 PROCEDURE — 96376 TX/PRO/DX INJ SAME DRUG ADON: CPT

## 2022-03-16 PROCEDURE — 84702 CHORIONIC GONADOTROPIN TEST: CPT | Performed by: PHYSICIAN ASSISTANT

## 2022-03-16 PROCEDURE — 96374 THER/PROPH/DIAG INJ IV PUSH: CPT

## 2022-03-16 PROCEDURE — 83690 ASSAY OF LIPASE: CPT | Performed by: EMERGENCY MEDICINE

## 2022-03-16 PROCEDURE — 76801 OB US < 14 WKS SINGLE FETUS: CPT

## 2022-03-16 RX ORDER — ONDANSETRON 2 MG/ML
4 INJECTION INTRAMUSCULAR; INTRAVENOUS ONCE
Status: COMPLETED | OUTPATIENT
Start: 2022-03-16 | End: 2022-03-16

## 2022-03-16 RX ORDER — ONDANSETRON 2 MG/ML
4 INJECTION INTRAMUSCULAR; INTRAVENOUS EVERY 30 MIN PRN
Status: DISCONTINUED | OUTPATIENT
Start: 2022-03-16 | End: 2022-03-17 | Stop reason: HOSPADM

## 2022-03-16 RX ORDER — SODIUM CHLORIDE 9 MG/ML
INJECTION, SOLUTION INTRAVENOUS CONTINUOUS
Status: DISCONTINUED | OUTPATIENT
Start: 2022-03-17 | End: 2022-03-17 | Stop reason: HOSPADM

## 2022-03-16 RX ADMIN — ONDANSETRON 4 MG: 2 INJECTION INTRAMUSCULAR; INTRAVENOUS at 21:39

## 2022-03-16 RX ADMIN — ONDANSETRON 4 MG: 2 INJECTION INTRAMUSCULAR; INTRAVENOUS at 23:24

## 2022-03-16 RX ADMIN — SODIUM CHLORIDE 1000 ML: 9 INJECTION, SOLUTION INTRAVENOUS at 23:24

## 2022-03-17 VITALS
RESPIRATION RATE: 12 BRPM | SYSTOLIC BLOOD PRESSURE: 110 MMHG | TEMPERATURE: 97.2 F | HEART RATE: 78 BPM | OXYGEN SATURATION: 100 % | DIASTOLIC BLOOD PRESSURE: 64 MMHG

## 2022-03-17 LAB
ALBUMIN UR-MCNC: 30 MG/DL
APPEARANCE UR: CLEAR
BILIRUB UR QL STRIP: NEGATIVE
COLOR UR AUTO: YELLOW
GLUCOSE UR STRIP-MCNC: NEGATIVE MG/DL
HGB UR QL STRIP: NEGATIVE
KETONES UR STRIP-MCNC: >150 MG/DL
LEUKOCYTE ESTERASE UR QL STRIP: NEGATIVE
LIPASE SERPL-CCNC: 102 U/L (ref 73–393)
MUCOUS THREADS #/AREA URNS LPF: PRESENT /LPF
NITRATE UR QL: NEGATIVE
PH UR STRIP: 5.5 [PH] (ref 5–7)
RBC URINE: <1 /HPF
SP GR UR STRIP: 1.03 (ref 1–1.03)
SQUAMOUS EPITHELIAL: 1 /HPF
UROBILINOGEN UR STRIP-MCNC: NORMAL MG/DL
WBC URINE: <1 /HPF

## 2022-03-17 PROCEDURE — 81001 URINALYSIS AUTO W/SCOPE: CPT | Performed by: EMERGENCY MEDICINE

## 2022-03-17 RX ORDER — ONDANSETRON 4 MG/1
4 TABLET, ORALLY DISINTEGRATING ORAL EVERY 6 HOURS PRN
Qty: 10 TABLET | Refills: 0 | Status: SHIPPED | OUTPATIENT
Start: 2022-03-17 | End: 2022-03-20

## 2022-03-17 ASSESSMENT — ENCOUNTER SYMPTOMS
NAUSEA: 1
DYSURIA: 0
VOMITING: 1

## 2022-03-17 NOTE — ED TRIAGE NOTES
Pt presents after finding out she was pregnant yesterday, with abdominal pain, vomiting and diarrhea.

## 2022-03-17 NOTE — ED PROVIDER NOTES
History   Chief Complaint:  Abdominal Pain and Emesis During Pregnancy       The history is provided by the patient.      Mary Alice Aragon is a 36 year old female with history of recurrent UTI, , and previous miscarriage who presents with nausea, abdominal cramping, and emesis during pregnancy. She developed nausea, vomiting, and lower abdominal cramping yesterday. She took a pregnancy test and it came back positive. She suspects that she is 6 weeks pregnant. She continues to have nausea and vomiting today, but her abdominal cramping has resolved. She is unable to keep food and fluids down. She feels improved at bedside after receiving a dose of Zofran. This would be her 5 pregnancy. She denies vaginal bleeding, vaginal discharge, dysuria, or chest pain.     Review of Systems   Cardiovascular: Negative for chest pain.   Gastrointestinal: Positive for nausea and vomiting.   Genitourinary: Negative for dysuria, vaginal bleeding and vaginal discharge.   All other systems reviewed and are negative.    Allergies:  Ciprofloxacin    Medications:  Xanax   Buspar   Celexa     Past Medical History:     Anxiety disorder   Bronchospasm   Former smoker   Major depressive disorder, recurrent episode, moderate (H)   Mixed hyperlipidemia   Obesity   Other mixed anxiety disorders   Recurrent UTI   Missed   Fetal demise  Previous stillbirth or demise, antepartum - at 36.5 wks EGA  2016   Neck muscle spasm  Marijuana use, continuous- nightly for sleep - working with therapist on quitting   Adjustment disorder with mixed anxiety and depressed mood- since stillbirth of son 2016     Past Surgical History:    Dilation and curettage      Family History:    Mother - HTN, lipids, hypothyroidism   Father - lipids     Social History:  Patient presents to the ED with her .  Hx of alcohol use, tobacco use (former smoker), and marijuana use     Physical Exam     Patient Vitals for the past 24 hrs:   BP Temp Temp src  Pulse Resp SpO2   03/17/22 0135 -- -- -- -- -- 100 %   03/17/22 0130 110/64 -- -- 78 -- --   03/17/22 0120 -- -- -- -- -- 100 %   03/17/22 0115 110/65 -- -- 80 -- 100 %   03/17/22 0113 95/57 -- -- 91 -- 100 %   03/17/22 0100 -- -- -- -- -- 100 %   03/17/22 0055 -- -- -- -- -- 100 %   03/17/22 0046 -- -- -- -- -- 100 %   03/16/22 2330 110/65 -- -- 78 -- 100 %   03/16/22 2326 109/62 -- -- 79 -- --   03/16/22 2142 -- 97.2  F (36.2  C) Temporal -- -- --   03/16/22 2131 123/83 -- -- 97 12 100 %       Physical Exam  Constitutional: Patient is well appearing. No distress.  Head: Atraumatic.  Mouth/Throat: Oropharynx is clear and moist. No oropharyngeal exudate.  Eyes: Conjunctivae and EOM are normal. No scleral icterus.  Neck: Normal range of motion. Neck supple.   Cardiovascular: Normal rate, regular rhythm, normal heart sounds and intact distal pulses.   Pulmonary/Chest: Breath sounds normal. No respiratory distress.  Abdominal: Soft. Bowel sounds are normal. No distension. No tenderness. No rebound or guarding.   Musculoskeletal: Normal range of motion. No edema or tenderness.   Neurological: Alert and orientated to person, place, and time. No observable focal neuro deficit  Skin: Warm and dry. No rash noted. Not diaphoretic.     Emergency Department Course   Imaging:  US Abdomen Limited (RUQ)  Final Result  IMPRESSION:  1.  Normal limited abdominal ultrasound.    US OB <14 Weeks W Transvaginal  Final Result  IMPRESSION:   1.  Single living intrauterine gestation at 6 weeks 0 days, EDC 11/10/2022. Recommend continued follow-up with beta hCG and ultrasound as clinically indicated.  2.  Small subchorionic hematoma.  3.  Small volume free fluid.    Report per radiology    Laboratory:  Labs Ordered and Resulted from Time of ED Arrival to Time of ED Departure   COMPREHENSIVE METABOLIC PANEL - Abnormal       Result Value    Sodium 134      Potassium 3.5      Chloride 105      Carbon Dioxide (CO2) 21      Anion Gap 8      Urea  Nitrogen 12      Creatinine 0.60      Calcium 9.7      Glucose 111 (*)     Alkaline Phosphatase 37 (*)     AST 15      ALT 21      Protein Total 8.0      Albumin 4.2      Bilirubin Total 2.4 (*)     GFR Estimate >90     HCG QUANTITATIVE PREGNANCY - Abnormal    hCG Quantitative 47,675 (*)    CBC WITH PLATELETS AND DIFFERENTIAL - Abnormal    WBC Count 11.3 (*)     RBC Count 4.48      Hemoglobin 13.7      Hematocrit 41.2      MCV 92      MCH 30.6      MCHC 33.3      RDW 11.9      Platelet Count 220      % Neutrophils 90      % Lymphocytes 8      % Monocytes 2      % Eosinophils 0      % Basophils 0      % Immature Granulocytes 0      NRBCs per 100 WBC 0      Absolute Neutrophils 10.0 (*)     Absolute Lymphocytes 0.9      Absolute Monocytes 0.3      Absolute Eosinophils 0.0      Absolute Basophils 0.0      Absolute Immature Granulocytes 0.0      Absolute NRBCs 0.0     ROUTINE UA WITH MICROSCOPIC REFLEX TO CULTURE - Abnormal    Color Urine Yellow      Appearance Urine Clear      Glucose Urine Negative      Bilirubin Urine Negative      Ketones Urine >150 (*)     Specific Gravity Urine 1.029      Blood Urine Negative      pH Urine 5.5      Protein Albumin Urine 30  (*)     Urobilinogen Urine Normal      Nitrite Urine Negative      Leukocyte Esterase Urine Negative      Mucus Urine Present (*)     RBC Urine <1      WBC Urine <1      Squamous Epithelials Urine 1     LIPASE - Normal    Lipase 102        Emergency Department Course:    Reviewed:  I reviewed nursing notes, vitals and past medical history    Assessments:  2314 I obtained history and examined the patient as noted above.   0116 I rechecked the patient and explained findings. She feels improved and I discussed plan for discharge home.    Interventions:  2139 Zofran 4 mg IV   2324 NS 1 L IV  2324 Zofran 4 mg IV     Disposition:  The patient was discharged to home.     Impression & Plan   Medical Decision Making:  Mary Alice Aragon is a 36 year old female who  presents for evaluation of nausea and vomiting.  She is currently pregnant. Nonetheless. I considered a broad differential diagnosis for this patient including viral gastroenteritis, food poisoning, bowel obstruction, intra-abdominal infection such as colitis, cholecystitis, UTI, pyelonephritis, appendicitis, etc.  There are no signs of worrisome intra-abdominal pathologies detected during the visit today.  The patient has a completely benign abdominal exam without rebound, guarding, or marked tenderness to palpation. Supportive outpatient management is therefore indicated.  Abdominal pain precautions are given for home. It was discussed with the patient to return to the ED.  I believe all of her symptoms are at this point explained by the pregnancy and hyperemesis gravidarum.      Diagnosis:    ICD-10-CM    1. Hyperemesis gravidarum  O21.0        Discharge Medications:  New Prescriptions    ONDANSETRON (ZOFRAN ODT) 4 MG ODT TAB    Take 1 tablet (4 mg) by mouth every 6 hours as needed for nausea or vomiting       Scribe Disclosure:  I, Juan Jose Burrows, am serving as a scribe at 12:37 AM on 3/17/2022 to document services personally performed by Nicolas Chavis MD based on my observations and the provider's statements to me.        Nicolas Chavis MD  03/17/22 [id number]

## 2022-03-22 ENCOUNTER — HOSPITAL ENCOUNTER (EMERGENCY)
Facility: CLINIC | Age: 37
Discharge: HOME | End: 2022-03-22
Attending: PHYSICIAN ASSISTANT | Admitting: PHYSICIAN ASSISTANT
Payer: COMMERCIAL

## 2022-03-22 VITALS
HEART RATE: 65 BPM | OXYGEN SATURATION: 100 % | DIASTOLIC BLOOD PRESSURE: 52 MMHG | RESPIRATION RATE: 16 BRPM | TEMPERATURE: 99 F | SYSTOLIC BLOOD PRESSURE: 108 MMHG

## 2022-03-22 DIAGNOSIS — R11.2 NAUSEA AND VOMITING, INTRACTABILITY OF VOMITING NOT SPECIFIED, UNSPECIFIED VOMITING TYPE: ICD-10-CM

## 2022-03-22 LAB
ANION GAP SERPL CALCULATED.3IONS-SCNC: 6 MMOL/L (ref 3–14)
BUN SERPL-MCNC: 9 MG/DL (ref 7–30)
CALCIUM SERPL-MCNC: 9.3 MG/DL (ref 8.5–10.1)
CHLORIDE BLD-SCNC: 104 MMOL/L (ref 94–109)
CO2 SERPL-SCNC: 26 MMOL/L (ref 20–32)
CREAT SERPL-MCNC: 0.78 MG/DL (ref 0.52–1.04)
GFR SERPL CREATININE-BSD FRML MDRD: >90 ML/MIN/1.73M2
GLUCOSE BLD-MCNC: 92 MG/DL (ref 70–99)
HOLD SPECIMEN: NORMAL
HOLD SPECIMEN: NORMAL
POTASSIUM BLD-SCNC: 3.9 MMOL/L (ref 3.4–5.3)
SODIUM SERPL-SCNC: 136 MMOL/L (ref 133–144)

## 2022-03-22 PROCEDURE — 250N000011 HC RX IP 250 OP 636: Performed by: EMERGENCY MEDICINE

## 2022-03-22 PROCEDURE — 80048 BASIC METABOLIC PNL TOTAL CA: CPT | Performed by: PHYSICIAN ASSISTANT

## 2022-03-22 PROCEDURE — 250N000011 HC RX IP 250 OP 636: Performed by: PHYSICIAN ASSISTANT

## 2022-03-22 PROCEDURE — 99285 EMERGENCY DEPT VISIT HI MDM: CPT | Mod: 25

## 2022-03-22 PROCEDURE — 96361 HYDRATE IV INFUSION ADD-ON: CPT

## 2022-03-22 PROCEDURE — 36415 COLL VENOUS BLD VENIPUNCTURE: CPT | Performed by: PHYSICIAN ASSISTANT

## 2022-03-22 PROCEDURE — 258N000003 HC RX IP 258 OP 636: Performed by: PHYSICIAN ASSISTANT

## 2022-03-22 PROCEDURE — 96374 THER/PROPH/DIAG INJ IV PUSH: CPT

## 2022-03-22 RX ORDER — METOCLOPRAMIDE HYDROCHLORIDE 5 MG/ML
10 INJECTION INTRAMUSCULAR; INTRAVENOUS ONCE
Status: COMPLETED | OUTPATIENT
Start: 2022-03-22 | End: 2022-03-22

## 2022-03-22 RX ORDER — METOCLOPRAMIDE 5 MG/1
10 TABLET ORAL 2 TIMES DAILY PRN
Qty: 10 TABLET | Refills: 0 | Status: SHIPPED | OUTPATIENT
Start: 2022-03-22

## 2022-03-22 RX ORDER — ONDANSETRON 4 MG/1
4 TABLET, ORALLY DISINTEGRATING ORAL ONCE
Status: COMPLETED | OUTPATIENT
Start: 2022-03-22 | End: 2022-03-22

## 2022-03-22 RX ADMIN — SODIUM CHLORIDE 1000 ML: 9 INJECTION, SOLUTION INTRAVENOUS at 17:59

## 2022-03-22 RX ADMIN — ONDANSETRON 4 MG: 4 TABLET, ORALLY DISINTEGRATING ORAL at 15:55

## 2022-03-22 RX ADMIN — METOCLOPRAMIDE HYDROCHLORIDE 10 MG: 5 INJECTION INTRAMUSCULAR; INTRAVENOUS at 17:28

## 2022-03-22 RX ADMIN — SODIUM CHLORIDE 1000 ML: 9 INJECTION, SOLUTION INTRAVENOUS at 17:28

## 2022-03-22 ASSESSMENT — ENCOUNTER SYMPTOMS
DYSURIA: 0
VOMITING: 1
HEADACHES: 0
HEMATURIA: 0
DIZZINESS: 1
ABDOMINAL PAIN: 0
UNEXPECTED WEIGHT CHANGE: 1
NAUSEA: 1

## 2022-03-22 NOTE — ED TRIAGE NOTES
Pt approx 7 wks preg. Emesis x1 week. Per pt, insurance limited how much zofran pt can get. No vag bleeding/discharge. ABC intact.

## 2022-03-22 NOTE — ED PROVIDER NOTES
History   Chief Complaint:  Morning Sickness       HPI   Mary Alice Aragon is a 36 year old female, currently about 7 weeks pregnant, who presents with nausea and vomiting. The patient reports vomiting starting 6 days ago which seemed to improve 3 days ago, but has worsened significantly again in the last 2 days. She notes 7 pounds of weight loss, stating that she is unable to keep anything down and this is impairing her ability to work. She reports dizziness when standing and otherwise denies headache, abdominal pain, hematemesis, vaginal bleeding, and urinary symptoms. Patient states that she has been pregnant before and never had symptoms like these.    Review of Systems   Constitutional: Positive for unexpected weight change (loss).   Gastrointestinal: Positive for nausea and vomiting. Negative for abdominal pain.   Genitourinary: Negative for dysuria, hematuria and vaginal bleeding.   Neurological: Positive for dizziness. Negative for headaches.   All other systems reviewed and are negative.    Allergies:  Ciprofloxacin    Medications:  Xanax  Celexa    Past Medical History:    Anxiety  Depression  Hyperlipidemia     Past Surgical History:    Dilation and curettage     Family History:    Hypertension - mother  Lipids - mother, father  Hypothyroidism - mother    Social History:  The patient presents to the emergency department with her .  PCP: [facility], [facility] [place]   Marital Status:     Physical Exam     Patient Vitals for the past 24 hrs:   BP Temp Temp src Pulse Resp SpO2   03/22/22 1756 108/52 99  F (37.2  C) Oral 65 16 100 %   03/22/22 1557 123/69 97.9  F (36.6  C) Temporal 97 16 98 %       Physical Exam  General: Uncomfortable appearing, pleasant woman, resting on exam bed  HEENT: No evidence of trauma.  Conjunctive are clear.  Extraocular eye movements intact.  Neck range of motion intact.  Nose and throat clear.  Respiratory: Good breath sounds bilaterally  Cardiovascular:  Normal rate and rhythm   Gastrointestinal: Soft, nontender.   Musculoskeletal: Atraumatic  Skin: Exposed skin clear.  Neurologic: Alert.  Psych:  Patient is cooperative, with normal affect.     Emergency Department Course   Laboratory:  Labs Ordered and Resulted from Time of ED Arrival to Time of ED Departure   BASIC METABOLIC PANEL - Normal       Result Value    Sodium 136      Potassium 3.9      Chloride 104      Carbon Dioxide (CO2) 26      Anion Gap 6      Urea Nitrogen 9      Creatinine 0.78      Calcium 9.3      Glucose 92      GFR Estimate >90        Emergency Department Course:  Reviewed:  I reviewed the patient's nursing notes, vitals, past medical records, and Care Everywhere.     Assessments:  1712 I performed an exam of the patient and obtained history, as documented above.     1825 I rechecked the patient, who is feeling improved after interventions. She and her  are comfortable with discharge to home at this time.    Interventions:  1555 Zofran 4 mg ODT    1728 NS 1 L IV Bolus    Reglan 10 mg IV    1759 NS 1 L IV Bolus     Disposition:  The patient was discharged to home.     Impression & Plan   Medical Decision Making:  Mary Alice Aragon is a 36 year old female, currently about 7 weeks pregnant, who presents with nausea and vomiting.  See HPI.  Her vitals are unremarkable.  Her abdominal exam is benign.  Ultimately the patient is requesting fluids.  She feels better after the interventions above and requests discharge.  Large differential considered including but not limited to AAA, ACS, hepatobiliary disease, sepsis, UTI, pyelonephritis, small bowel obstruction, amongst others however the patient is currently pregnant and has had a similar presentation several days prior to the emergency room.  Symptomatic care is indicated and she is to return with new or worsening symptoms.  She is terminating the pregnancy.  We will send Reglan to the pharmacy.  Discharge home with  .    Diagnosis:    ICD-10-CM    1. Nausea and vomiting, intractability of vomiting not specified, unspecified vomiting type  R11.2        Discharge Medications:  Discharge Medication List as of 3/22/2022  6:32 PM      START taking these medications    Details   metoclopramide (REGLAN) 5 MG tablet Take 2 tablets (10 mg) by mouth 2 times daily as needed (vomiting), Disp-10 tablet, R-0, E-Prescribe             Scribe Disclosure:  I, Hedy Burns, am serving as a scribe at 5:07 PM on 3/22/2022 to document services personally performed by Karthikeyan Vivar PA-C based on my observations and the provider's statements to me.      Karthikeyan Vivar PA-C  03/22/22 [id number]